# Patient Record
Sex: MALE | Race: WHITE | Employment: FULL TIME | URBAN - METROPOLITAN AREA
[De-identification: names, ages, dates, MRNs, and addresses within clinical notes are randomized per-mention and may not be internally consistent; named-entity substitution may affect disease eponyms.]

---

## 2021-03-23 ENCOUNTER — APPOINTMENT (OUTPATIENT)
Dept: CT IMAGING | Facility: CLINIC | Age: 71
End: 2021-03-23
Attending: EMERGENCY MEDICINE
Payer: COMMERCIAL

## 2021-03-23 ENCOUNTER — HOSPITAL ENCOUNTER (EMERGENCY)
Facility: CLINIC | Age: 71
Discharge: HOME | End: 2021-03-24
Attending: EMERGENCY MEDICINE | Admitting: EMERGENCY MEDICINE
Payer: COMMERCIAL

## 2021-03-23 DIAGNOSIS — R10.9 FLANK PAIN: ICD-10-CM

## 2021-03-23 DIAGNOSIS — E87.1 HYPONATREMIA: ICD-10-CM

## 2021-03-23 DIAGNOSIS — N20.1 URETEROLITHIASIS: ICD-10-CM

## 2021-03-23 DIAGNOSIS — K59.03 DRUG-INDUCED CONSTIPATION: ICD-10-CM

## 2021-03-23 DIAGNOSIS — R79.89 ELEVATED SERUM CREATININE: ICD-10-CM

## 2021-03-23 DIAGNOSIS — I10 HYPERTENSION, UNSPECIFIED TYPE: ICD-10-CM

## 2021-03-23 LAB
ALBUMIN UR-MCNC: 70 MG/DL
ANION GAP SERPL CALCULATED.3IONS-SCNC: 6 MMOL/L (ref 3–14)
APPEARANCE UR: CLEAR
BASOPHILS # BLD AUTO: 0 10E9/L (ref 0–0.2)
BASOPHILS NFR BLD AUTO: 0.2 %
BILIRUB UR QL STRIP: NEGATIVE
BUN SERPL-MCNC: 15 MG/DL (ref 7–30)
CALCIUM SERPL-MCNC: 9.5 MG/DL (ref 8.5–10.1)
CHLORIDE SERPL-SCNC: 97 MMOL/L (ref 94–109)
CO2 SERPL-SCNC: 25 MMOL/L (ref 20–32)
COLOR UR AUTO: ABNORMAL
CREAT SERPL-MCNC: 1.43 MG/DL (ref 0.66–1.25)
DIFFERENTIAL METHOD BLD: ABNORMAL
EOSINOPHIL # BLD AUTO: 0 10E9/L (ref 0–0.7)
EOSINOPHIL NFR BLD AUTO: 0.3 %
ERYTHROCYTE [DISTWIDTH] IN BLOOD BY AUTOMATED COUNT: 13.1 % (ref 10–15)
GFR SERPL CREATININE-BSD FRML MDRD: 49 ML/MIN/{1.73_M2}
GLUCOSE SERPL-MCNC: 134 MG/DL (ref 70–99)
GLUCOSE UR STRIP-MCNC: 100 MG/DL
HCT VFR BLD AUTO: 45.3 % (ref 40–53)
HGB BLD-MCNC: 15.2 G/DL (ref 13.3–17.7)
HGB UR QL STRIP: NEGATIVE
IMM GRANULOCYTES # BLD: 0.1 10E9/L (ref 0–0.4)
IMM GRANULOCYTES NFR BLD: 0.6 %
KETONES UR STRIP-MCNC: ABNORMAL MG/DL
LABORATORY COMMENT REPORT: NORMAL
LEUKOCYTE ESTERASE UR QL STRIP: NEGATIVE
LYMPHOCYTES # BLD AUTO: 0.5 10E9/L (ref 0.8–5.3)
LYMPHOCYTES NFR BLD AUTO: 5.9 %
MCH RBC QN AUTO: 29.9 PG (ref 26.5–33)
MCHC RBC AUTO-ENTMCNC: 33.6 G/DL (ref 31.5–36.5)
MCV RBC AUTO: 89 FL (ref 78–100)
MONOCYTES # BLD AUTO: 0.6 10E9/L (ref 0–1.3)
MONOCYTES NFR BLD AUTO: 6.7 %
NEUTROPHILS # BLD AUTO: 7.6 10E9/L (ref 1.6–8.3)
NEUTROPHILS NFR BLD AUTO: 86.3 %
NITRATE UR QL: NEGATIVE
NRBC # BLD AUTO: 0 10*3/UL
NRBC BLD AUTO-RTO: 0 /100
PH UR STRIP: 6 PH (ref 5–7)
PLATELET # BLD AUTO: 157 10E9/L (ref 150–450)
POTASSIUM SERPL-SCNC: 4.2 MMOL/L (ref 3.4–5.3)
RBC # BLD AUTO: 5.09 10E12/L (ref 4.4–5.9)
RBC #/AREA URNS AUTO: 1 /HPF (ref 0–2)
SARS-COV-2 RNA RESP QL NAA+PROBE: NEGATIVE
SODIUM SERPL-SCNC: 128 MMOL/L (ref 133–144)
SOURCE: ABNORMAL
SP GR UR STRIP: 1.02 (ref 1–1.03)
SPECIMEN SOURCE: NORMAL
UROBILINOGEN UR STRIP-MCNC: NORMAL MG/DL (ref 0–2)
WBC # BLD AUTO: 8.8 10E9/L (ref 4–11)
WBC #/AREA URNS AUTO: 1 /HPF (ref 0–5)

## 2021-03-23 PROCEDURE — 36415 COLL VENOUS BLD VENIPUNCTURE: CPT

## 2021-03-23 PROCEDURE — C9803 HOPD COVID-19 SPEC COLLECT: HCPCS

## 2021-03-23 PROCEDURE — 96376 TX/PRO/DX INJ SAME DRUG ADON: CPT

## 2021-03-23 PROCEDURE — 250N000011 HC RX IP 250 OP 636: Performed by: EMERGENCY MEDICINE

## 2021-03-23 PROCEDURE — 258N000003 HC RX IP 258 OP 636: Performed by: EMERGENCY MEDICINE

## 2021-03-23 PROCEDURE — 80048 BASIC METABOLIC PNL TOTAL CA: CPT | Performed by: EMERGENCY MEDICINE

## 2021-03-23 PROCEDURE — 96361 HYDRATE IV INFUSION ADD-ON: CPT

## 2021-03-23 PROCEDURE — 96374 THER/PROPH/DIAG INJ IV PUSH: CPT

## 2021-03-23 PROCEDURE — 99285 EMERGENCY DEPT VISIT HI MDM: CPT | Mod: 25

## 2021-03-23 PROCEDURE — 96375 TX/PRO/DX INJ NEW DRUG ADDON: CPT

## 2021-03-23 PROCEDURE — 87635 SARS-COV-2 COVID-19 AMP PRB: CPT | Performed by: EMERGENCY MEDICINE

## 2021-03-23 PROCEDURE — 74176 CT ABD & PELVIS W/O CONTRAST: CPT

## 2021-03-23 PROCEDURE — 85025 COMPLETE CBC W/AUTO DIFF WBC: CPT | Performed by: EMERGENCY MEDICINE

## 2021-03-23 PROCEDURE — 81001 URINALYSIS AUTO W/SCOPE: CPT | Performed by: EMERGENCY MEDICINE

## 2021-03-23 RX ORDER — SODIUM CHLORIDE 9 MG/ML
INJECTION, SOLUTION INTRAVENOUS CONTINUOUS
Status: DISCONTINUED | OUTPATIENT
Start: 2021-03-23 | End: 2021-03-23

## 2021-03-23 RX ORDER — HYDROMORPHONE HYDROCHLORIDE 1 MG/ML
0.5 INJECTION, SOLUTION INTRAMUSCULAR; INTRAVENOUS; SUBCUTANEOUS
Status: DISCONTINUED | OUTPATIENT
Start: 2021-03-23 | End: 2021-03-24 | Stop reason: HOSPADM

## 2021-03-23 RX ORDER — DOCUSATE SODIUM 100 MG/1
100 CAPSULE, LIQUID FILLED ORAL 3 TIMES DAILY PRN
Qty: 20 CAPSULE | Refills: 0 | Status: SHIPPED | OUTPATIENT
Start: 2021-03-23

## 2021-03-23 RX ORDER — KETOROLAC TROMETHAMINE 15 MG/ML
15 INJECTION, SOLUTION INTRAMUSCULAR; INTRAVENOUS ONCE
Status: COMPLETED | OUTPATIENT
Start: 2021-03-23 | End: 2021-03-23

## 2021-03-23 RX ORDER — TAMSULOSIN HYDROCHLORIDE 0.4 MG/1
0.4 CAPSULE ORAL ONCE
Status: DISCONTINUED | OUTPATIENT
Start: 2021-03-23 | End: 2021-03-24 | Stop reason: HOSPADM

## 2021-03-23 RX ORDER — SODIUM CHLORIDE 9 MG/ML
INJECTION, SOLUTION INTRAVENOUS ONCE
Status: DISCONTINUED | OUTPATIENT
Start: 2021-03-23 | End: 2021-03-24 | Stop reason: HOSPADM

## 2021-03-23 RX ORDER — ONDANSETRON 2 MG/ML
4 INJECTION INTRAMUSCULAR; INTRAVENOUS EVERY 30 MIN PRN
Status: DISCONTINUED | OUTPATIENT
Start: 2021-03-23 | End: 2021-03-24 | Stop reason: HOSPADM

## 2021-03-23 RX ADMIN — SODIUM CHLORIDE 1000 ML: 9 INJECTION, SOLUTION INTRAVENOUS at 21:59

## 2021-03-23 RX ADMIN — HYDROMORPHONE HYDROCHLORIDE 0.5 MG: 1 INJECTION, SOLUTION INTRAMUSCULAR; INTRAVENOUS; SUBCUTANEOUS at 22:28

## 2021-03-23 RX ADMIN — KETOROLAC TROMETHAMINE 15 MG: 15 INJECTION, SOLUTION INTRAMUSCULAR; INTRAVENOUS at 22:28

## 2021-03-23 RX ADMIN — HYDROMORPHONE HYDROCHLORIDE 0.5 MG: 1 INJECTION, SOLUTION INTRAMUSCULAR; INTRAVENOUS; SUBCUTANEOUS at 22:00

## 2021-03-23 RX ADMIN — ONDANSETRON 4 MG: 2 INJECTION INTRAMUSCULAR; INTRAVENOUS at 22:27

## 2021-03-23 RX ADMIN — HYDROMORPHONE HYDROCHLORIDE 0.5 MG: 1 INJECTION, SOLUTION INTRAMUSCULAR; INTRAVENOUS; SUBCUTANEOUS at 23:20

## 2021-03-23 ASSESSMENT — ENCOUNTER SYMPTOMS
ABDOMINAL PAIN: 1
HEMATURIA: 0
CONSTIPATION: 1
ABDOMINAL DISTENTION: 1
DYSURIA: 0

## 2021-03-24 VITALS
DIASTOLIC BLOOD PRESSURE: 70 MMHG | HEART RATE: 69 BPM | OXYGEN SATURATION: 96 % | TEMPERATURE: 97.8 F | SYSTOLIC BLOOD PRESSURE: 112 MMHG | RESPIRATION RATE: 20 BRPM | WEIGHT: 185 LBS

## 2021-03-24 RX ORDER — OXYCODONE HYDROCHLORIDE 5 MG/1
5 TABLET ORAL EVERY 6 HOURS PRN
Qty: 8 TABLET | Refills: 0 | Status: SHIPPED | OUTPATIENT
Start: 2021-03-24

## 2021-03-24 NOTE — ED PROVIDER NOTES
History     Chief Complaint:  Flank Pain       The history is provided by the patient.     Marcio Renner is a 70 year old male with a history of hypercholesterolemia, hyperlipidemia, CAD, and ASCVD among others who presents for evaluation of left flank pain. The patient was seen at urgent care two days ago and was found to have a ureteral stone. He was discharged to home with oxycodone and Zofran for symptom management and instructed on close outpatient follow up. However, he has continued to have severe left sided flank pain and yesterday also developed abdominal distension. Today he has had left groin pain as well. Given his continued severe harmony,n which he ranks as a 10/10, he decided to present. He has not taken any interventions for pain within the past four hours.     Here, he denies hematuria, dysuria, or other symptoms. He has been taking Miralax though has not had a bowel movement in two days.      CT Abdomen Pelvis w/o IV Contrast - stone protocol from 03/21/2021:  IMPRESSION:      1. Mild left hydronephrosis and hydroureter likely caused by a 1 to 2 mm calculus in the distal left ureter. There may be an additional punctate calculus in the left mid ureter. Moderate fluid stranding surrounding the left kidney and proximal to mid left ureter.    2. No additional urinary tract calculi.    3. Mildly prominent central mesenteric lymph nodes with surrounding lucent halos, suggesting mesenteric panniculitis.    Reading per radiology.     Review of Systems   Gastrointestinal: Positive for abdominal distention, abdominal pain and constipation.   Genitourinary: Negative for dysuria and hematuria.   All other systems reviewed and are negative.        Allergies:  Atorvastatin      Medications:   Atorvastatin   Nitroglycerin   Ezetimibe   Albuterol sulfate  Advair inhaler  Losartan   Rosuvastatin   Valtrex   Tadalafil  Oxycodone   Zofran   Lamisil     Medical History:   CAD  Hyperlipidemia   Hypercholesterolemia    Spondylolysis  Lumbar stenosis with neurogenic claudication   HSV  GERD  Asthma   Atherosclerotic cardiovascular disease  Psoriasis   Headaches  Colon polyp    Surgical History:  CABG  TKA - right  GSVRFA and avulsions - bilateral   Knee arthroscopy - left  Shoulder  surgery   Back surgery - lumbar fusion   Tonsillectomy & Adenoidectomy   Sinus surgery - left  Rotator cuff repair - right    Family History:   Father -  Heart disease     Social History:  The patient was accompanied to the ED by his wife.  Marital Status:       Physical Exam     Patient Vitals for the past 24 hrs:   BP Temp Temp src Pulse Resp SpO2 Weight   03/23/21 2200 (!) 167/98 -- -- 84 -- 97 % --   03/23/21 2110 (!) 174/97 -- -- 95 20 98 % 83.9 kg (185 lb)   03/23/21 2109 -- 97.8  F (36.6  C) Temporal -- -- -- --          Physical Exam    General: The patient is alert, in no respiratory distress. Uncomfortable.     HENT: Mucous membranes moist.    Cardiovascular: Regular rate and rhythm. Good pulses in all four extremities. Normal capillary refill and skin turgor.     Respiratory: Lungs are clear. No nasal flaring. No retractions. No wheezing, no crackles.    Gastrointestinal: No palpable hernias. Abdomen is distended with some firmness and mild tenderness. No rebound. Not tympanitic.       Musculoskeletal: No gross deformity.     Skin: No rashes or petechiae. Slight pallor.    Neurologic: The patient is alert and oriented x3. GCS 15. No testable cranial nerve deficit. Follows commands with clear and appropriate speech. Gives appropriate answers. Good strength in all extremities. No gross neurologic deficit. Gross sensation intact. Pupils are round and reactive. No meningismus.     Lymphatic: No cervical adenopathy. No lower extremity swelling.    Psychiatric: The patient is non-tearful.      Emergency Department Course     Imaging:    CT Abdomen Pelvis w/o Contrast:   IMPRESSION:   1. 0.2 cm distal left ureteral calculus, resulting in  mild to moderate obstruction.   2. No additional renal or ureteral calculi.   Reading per radiology.     Laboratory:    CBC: WBC 8.8, HGB 15.2,   BMP:  (L), Glucose 134 (H), Creatinine 1.43 (H), GFR 49 (L),  o/w WNL     UA with Microscopic: Glucose 100 (A), Ketones Trace (A), Protein Albumin 70 (A), o/w WNL    Asymptomatic COVID-19 (Coronavirus) PCR by Nasopharyngeal Swab: Negative    Emergency Department Course:    Reviewed:  2214 I reviewed the patient's nursing notes, vitals, past medical records, Care Everywhere.     Assessments:  2216 I performed an exam of the patient, as documented above.   2310 I rechecked the patient. His pain is improved and his abdominal distention is also improving.   2338 Patient rechecked and updated following imaging and laboratory results. His pain is well controlled and he feels comfortable with discharge.     Interventions:  2159 Normal Saline 1000 mL IV   2200 Dilaudid 0.5 mg IV   2227 Zofran 4 mg IV   2228 Dilaudid 0.5 mg IV   2228 Toradol 15 mg IV   2320 Dilaudid 0.5 mg IV     Disposition:  The patient was discharged to home.     Impression & Plan     Medical Decision Making:  The patient presented complaining of severe left flank pain and was very bloated.  I reviewed his outside records and he does have a proven kidney stone that is quite small on the left side.  However with his abdomen being so large and significant symptoms I considered alternative causes such as bowel obstruction aortic issues diverticulitis amongst others.  A CT scan was ordered to look for these causes.  The stone is still present is quite small otherwise the CT looks good.  Discussed his results.  It may be that his pain is worse because he is not taking pain medication frequently.  I am suspicious about constipation and review of his CT scan myself.  After treatment with pain medication here the patient was much more comfortable he does not show signs of a UTI he is otherwise stable.  I  did write him for some more pain medication and he was discharged to close follow-up.  Symptoms to return for discussed.  He did have slight elevation of his creatinine which is likely secondary to the stone.  I felt that pain is contributing to his elevated blood pressure and there is slight hyponatremia which is also kidney stone related.  With constipation I started on Dulcolax and he will follow-up with urology and his primary care doctor.    Covid-19  Marcio Renner was evaluated during a global COVID-19 pandemic, which necessitated consideration that the patient might be at risk for infection with the SARS-CoV-2 virus that causes COVID-19.   Applicable protocols for evaluation were followed during the patient's care.   COVID-19 was considered as part of the patient's evaluation. The plan for testing is:  a test was obtained during this visit.     Diagnosis:     ICD-10-CM    1. Flank pain  R10.9    2. Ureterolithiasis  N20.1    3. Hypertension, unspecified type  I10    4. Elevated serum creatinine  R79.89    5. Hyponatremia  E87.1    6. Drug-induced constipation  K59.03         Discharge Medications:  Discharge Medication List as of 3/24/2021 12:31 AM      START taking these medications    Details   docusate sodium (COLACE) 100 MG capsule Take 1 capsule (100 mg) by mouth 3 times daily as needed for constipation, Disp-20 capsule, R-0, Local Print             Scribe Disclosure:  Sara BARONE, am serving as a scribe at 10:13 PM on 3/23/2021 to document services personally performed by Yeison Medina MD based on my observations and the provider's statements to me.      Yeison Median MD  03/24/21 [id number]

## 2021-03-24 NOTE — DISCHARGE INSTRUCTIONS
Discharge Instructions  Kidney Stones    Kidney stones are a common problem that can cause a lot of pain but fortunately are usually not dangerous and can be generally treated with medicine at home.  However, sometimes your condition may be worse than it seemed at first, or may get worse with time.     You need to follow-up with your regular doctor within 3 days.    Most kidney stones will pass on their own, but occasionally stones may need to be removed by an urologist. We will send you home with a urine strainer. Be sure to urinate into this, or urinate into a container and pour the urine through the fine filter to catch the kidney stone as it comes out. The stone will seem like a pebble or grain of sand. Be sure to save this in a Ziploc  bag and take it to the doctor s office with you.       Return to the Emergency Department if:  Your pain is not controlled.  You are vomiting and can t keep fluids or medications down.  You develop fever (>101).  You feel much more ill or develop new symptoms.  What can I do to help myself?  Be sure to drink plenty of fluids.  Staying active is good, and may help the stone to pass. You may do whatever you feel up to doing without restrictions.   Treatment:  Non-steroidal anti-inflammatory drugs (NSAIDs). This includes prescription medicines like Toradol  (ketorolac) and non-prescription medicines like Advil  (ibuprofen) and Nuprin  (ibuprofen). These pain relievers are very effective for kidney stones.  Narcotic pain pills. If you have been given a narcotic such as Vicodin  (hydrocodone with acetaminophen), Percocet  (oxycodone with acetaminophen), or codeine, do not drive for four hours after you have taken it. If the narcotic contains Tylenol  (acetaminophen), do not take Tylenol  with it. All narcotics will cause constipation, so eat a high fiber diet.    Nausea medication.  Nausea and vomiting are common with kidney stones, so your physician may send you home with medicine  for this.   Flomax  (tamsulosin). This medicine is sometimes used for men with prostate problems, but also can help kidney stones to pass. This medicine can lower blood pressure, and you may feel faint, especially when you first stand up. Be sure to get up gradually, sit down if you feel faint, and avoid activity where feeling faint would be dangerous, such as climbing ladders.   If you were given a prescription for medicine here today, be sure to read all of the information (including the package insert) that comes with your prescription.  This will include important information about the medicine, its side effects, and any warnings that you need to know about.  The pharmacist who fills the prescription can provide more information and answer questions you may have about the medicine.  If you have questions or concerns that the pharmacist cannot address, please call or return to the Emergency Department.   Opioid Medication Information    Pain medications are among the most commonly prescribed medicines, so we are including this information for all our patients. If you did not receive pain medication or get a prescription for pain medicine, you can ignore it.     You may have been given a prescription for an opioid (narcotic) pain medicine and/or have received a pain medicine while here in the Emergency Department. These medicines can make you drowsy or impaired. You must not drive, operate dangerous equipment, or engage in any other dangerous activities while taking these medications. If you drive while taking these medications, you could be arrested for DUI, or driving under the influence. Do not drink any alcohol while you are taking these medications.     Opioid pain medications can cause addiction. If you have a history of chemical dependency of any type, you are at a higher risk of becoming addicted to pain medications.  Only take these prescribed medications to treat your pain when all other options have  been tried. Take it for as short a time and as few doses as possible. Store your pain pills in a secure place, as they are frequently stolen and provide a dangerous opportunity for children or visitors in your house to start abusing these powerful medications. We will not replace any lost or stolen medicine.  As soon as your pain is better, you should flush all your remaining medication.     Many prescription pain medications contain Tylenol  (acetaminophen), including Vicodin , Tylenol #3 , Norco , Lortab , and Percocet .  You should not take any extra pills of Tylenol  if you are using these prescription medications or you can get very sick.  Do not ever take more than 3000 mg of acetaminophen in any 24 hour period.    All opioids tend to cause constipation. Drink plenty of water and eat foods that have a lot of fiber, such as fruits, vegetables, prune juice, apple juice and high fiber cereal.  Take a laxative if you don t move your bowels at least every other day. Miralax , Milk of Magnesia, Colace , or Senna  can be used to keep you regular.      Remember that you can always come back to the Emergency Department if you are not able to see your regular doctor in the amount of time listed above, if you get any new symptoms, or if there is anything that worries you.    Discharge Instructions  Hypertension - High Blood Pressure    During you visit to the Emergency Department, your blood pressure was higher than the recommended blood pressure.  This may be related to stress, pain, medication or other temporary conditions. In these cases, your blood pressure may return to normal on its own. If you have a history of high blood pressure, you may need to have your doctor adjust your medications. Sometimes, your high measurement here may indicate that you have developed high blood pressure that will stay high unless it is treated. Sudden very high blood pressure can cause problems, but usually high blood pressure causes  problems over months to years.      Blood pressure is almost never lowered in the Emergency Department, because studies have shown that lowering blood pressure too quickly is much more dangerous than leaving it alone.    You need to follow up with your doctor in 1-3 days to get your blood pressure rechecked.     Return to the Emergency Department if you start to have:  A severe headache.  Chest pain.  Shortness of breath.  Weakness or numbness that affects one part of the body.  Confusion.  Vision changes.  Significant swelling of legs and/or eyes.  A reaction to any medication started in the Emergency Department.    What can I do to help myself?  Avoid alcohol.  Take any blood pressure medicine that you are prescribed.  Get a good night s sleep.  Lower your salt intake.  Exercise.  Lose weight.  Manage stress.    If blood pressure medication was started in the Emergency Department:  The medicine may not have an immediate effect. The body and brain determine what blood pressure you have. The medicine s job is to retrain the body s  thermostat  to a lower blood pressure.  You will need to follow up with your doctor to see how this medicine is working for you.  If you were given a prescription for medicine here today, be sure to read all of the information (including the package insert) that comes with your prescription.  This will include important information about the medicine, its side effects, and any warnings that you need to know about.  The pharmacist who fills the prescription can provide more information and answer questions you may have about the medicine.  If you have questions or concerns that the pharmacist cannot address, please call or return to the Emergency Department.   Opioid Medication Information    Pain medications are among the most commonly prescribed medicines, so we are including this information for all our patients. If you did not receive pain medication or get a prescription for pain  medicine, you can ignore it.     You may have been given a prescription for an opioid (narcotic) pain medicine and/or have received a pain medicine while here in the Emergency Department. These medicines can make you drowsy or impaired. You must not drive, operate dangerous equipment, or engage in any other dangerous activities while taking these medications. If you drive while taking these medications, you could be arrested for DUI, or driving under the influence. Do not drink any alcohol while you are taking these medications.     Opioid pain medications can cause addiction. If you have a history of chemical dependency of any type, you are at a higher risk of becoming addicted to pain medications.  Only take these prescribed medications to treat your pain when all other options have been tried. Take it for as short a time and as few doses as possible. Store your pain pills in a secure place, as they are frequently stolen and provide a dangerous opportunity for children or visitors in your house to start abusing these powerful medications. We will not replace any lost or stolen medicine.  As soon as your pain is better, you should flush all your remaining medication.     Many prescription pain medications contain Tylenol  (acetaminophen), including Vicodin , Tylenol #3 , Norco , Lortab , and Percocet .  You should not take any extra pills of Tylenol  if you are using these prescription medications or you can get very sick.  Do not ever take more than 3000 mg of acetaminophen in any 24 hour period.    All opioids tend to cause constipation. Drink plenty of water and eat foods that have a lot of fiber, such as fruits, vegetables, prune juice, apple juice and high fiber cereal.  Take a laxative if you don t move your bowels at least every other day. Miralax , Milk of Magnesia, Colace , or Senna  can be used to keep you regular.      Remember that you can always come back to the Emergency Department if you are not able  to see your regular doctor in the amount of time listed above, if you get any new symptoms, or if there is anything that worries you.

## 2021-03-24 NOTE — ED TRIAGE NOTES
Saturday night, left flank pain started and woke pt up from his sleep. Went to  and found he has a kidney stone. Was sent home with rx of oxycodone and zofran. Pain has not gotten better and now radiating to the left lower flank. Also no BM since Sunday. ABCs intact. A&Ox3.

## 2024-12-19 ENCOUNTER — HOSPITAL ENCOUNTER (OUTPATIENT)
Facility: CLINIC | Age: 74
Setting detail: OBSERVATION
End: 2024-12-19
Attending: STUDENT IN AN ORGANIZED HEALTH CARE EDUCATION/TRAINING PROGRAM | Admitting: INTERNAL MEDICINE
Payer: COMMERCIAL

## 2024-12-19 ENCOUNTER — APPOINTMENT (OUTPATIENT)
Dept: CT IMAGING | Facility: CLINIC | Age: 74
End: 2024-12-19
Attending: STUDENT IN AN ORGANIZED HEALTH CARE EDUCATION/TRAINING PROGRAM
Payer: COMMERCIAL

## 2024-12-19 ENCOUNTER — APPOINTMENT (OUTPATIENT)
Dept: ULTRASOUND IMAGING | Facility: CLINIC | Age: 74
End: 2024-12-19
Attending: STUDENT IN AN ORGANIZED HEALTH CARE EDUCATION/TRAINING PROGRAM
Payer: COMMERCIAL

## 2024-12-19 DIAGNOSIS — J81.0 ACUTE PULMONARY EDEMA (H): ICD-10-CM

## 2024-12-19 DIAGNOSIS — Z87.39 HISTORY OF GIANT CELL ARTERITIS: ICD-10-CM

## 2024-12-19 DIAGNOSIS — I48.91 ATRIAL FIBRILLATION WITH RVR (H): Primary | ICD-10-CM

## 2024-12-19 DIAGNOSIS — R52 PAIN: ICD-10-CM

## 2024-12-19 DIAGNOSIS — I50.22 CHRONIC SYSTOLIC HEART FAILURE (H): ICD-10-CM

## 2024-12-19 LAB
ANION GAP SERPL CALCULATED.3IONS-SCNC: 14 MMOL/L (ref 7–15)
ATRIAL RATE - MUSE: NORMAL BPM
BASOPHILS # BLD AUTO: 0 10E3/UL (ref 0–0.2)
BASOPHILS NFR BLD AUTO: 0 %
BUN SERPL-MCNC: 24.4 MG/DL (ref 8–23)
CALCIUM SERPL-MCNC: 9.3 MG/DL (ref 8.8–10.4)
CHLORIDE SERPL-SCNC: 99 MMOL/L (ref 98–107)
CREAT SERPL-MCNC: 0.95 MG/DL (ref 0.67–1.17)
D DIMER PPP FEU-MCNC: 1.19 UG/ML FEU (ref 0–0.5)
DIASTOLIC BLOOD PRESSURE - MUSE: NORMAL MMHG
EGFRCR SERPLBLD CKD-EPI 2021: 84 ML/MIN/1.73M2
EOSINOPHIL # BLD AUTO: 0 10E3/UL (ref 0–0.7)
EOSINOPHIL NFR BLD AUTO: 0 %
ERYTHROCYTE [DISTWIDTH] IN BLOOD BY AUTOMATED COUNT: 15.9 % (ref 10–15)
FLUAV RNA SPEC QL NAA+PROBE: NEGATIVE
FLUBV RNA RESP QL NAA+PROBE: NEGATIVE
GLUCOSE SERPL-MCNC: 152 MG/DL (ref 70–99)
HCO3 SERPL-SCNC: 21 MMOL/L (ref 22–29)
HCT VFR BLD AUTO: 41.9 % (ref 40–53)
HGB BLD-MCNC: 13.2 G/DL (ref 13.3–17.7)
HOLD SPECIMEN: NORMAL
HOLD SPECIMEN: NORMAL
IMM GRANULOCYTES # BLD: 0.2 10E3/UL
IMM GRANULOCYTES NFR BLD: 2 %
INTERPRETATION ECG - MUSE: NORMAL
LYMPHOCYTES # BLD AUTO: 0.5 10E3/UL (ref 0.8–5.3)
LYMPHOCYTES NFR BLD AUTO: 4 %
MAGNESIUM SERPL-MCNC: 2.2 MG/DL (ref 1.7–2.3)
MCH RBC QN AUTO: 27.8 PG (ref 26.5–33)
MCHC RBC AUTO-ENTMCNC: 31.5 G/DL (ref 31.5–36.5)
MCV RBC AUTO: 88 FL (ref 78–100)
MONOCYTES # BLD AUTO: 0.3 10E3/UL (ref 0–1.3)
MONOCYTES NFR BLD AUTO: 2 %
NEUTROPHILS # BLD AUTO: 12.8 10E3/UL (ref 1.6–8.3)
NEUTROPHILS NFR BLD AUTO: 92 %
NRBC # BLD AUTO: 0 10E3/UL
NRBC BLD AUTO-RTO: 0 /100
NT-PROBNP SERPL-MCNC: 734 PG/ML (ref 0–900)
P AXIS - MUSE: NORMAL DEGREES
PLATELET # BLD AUTO: 214 10E3/UL (ref 150–450)
POTASSIUM SERPL-SCNC: 4.7 MMOL/L (ref 3.4–5.3)
PR INTERVAL - MUSE: NORMAL MS
QRS DURATION - MUSE: 78 MS
QT - MUSE: 298 MS
QTC - MUSE: 414 MS
R AXIS - MUSE: 81 DEGREES
RBC # BLD AUTO: 4.74 10E6/UL (ref 4.4–5.9)
RSV RNA SPEC NAA+PROBE: NEGATIVE
SARS-COV-2 RNA RESP QL NAA+PROBE: NEGATIVE
SODIUM SERPL-SCNC: 134 MMOL/L (ref 135–145)
SYSTOLIC BLOOD PRESSURE - MUSE: NORMAL MMHG
T AXIS - MUSE: -64 DEGREES
TROPONIN T SERPL HS-MCNC: 15 NG/L
TROPONIN T SERPL HS-MCNC: 20 NG/L
TSH SERPL DL<=0.005 MIU/L-ACNC: 1.2 UIU/ML (ref 0.3–4.2)
VENTRICULAR RATE- MUSE: 116 BPM
WBC # BLD AUTO: 13.8 10E3/UL (ref 4–11)

## 2024-12-19 PROCEDURE — 250N000011 HC RX IP 250 OP 636: Performed by: INTERNAL MEDICINE

## 2024-12-19 PROCEDURE — 36415 COLL VENOUS BLD VENIPUNCTURE: CPT | Performed by: STUDENT IN AN ORGANIZED HEALTH CARE EDUCATION/TRAINING PROGRAM

## 2024-12-19 PROCEDURE — 71275 CT ANGIOGRAPHY CHEST: CPT

## 2024-12-19 PROCEDURE — 80048 BASIC METABOLIC PNL TOTAL CA: CPT | Performed by: STUDENT IN AN ORGANIZED HEALTH CARE EDUCATION/TRAINING PROGRAM

## 2024-12-19 PROCEDURE — 83880 ASSAY OF NATRIURETIC PEPTIDE: CPT | Performed by: EMERGENCY MEDICINE

## 2024-12-19 PROCEDURE — 93005 ELECTROCARDIOGRAM TRACING: CPT

## 2024-12-19 PROCEDURE — 250N000013 HC RX MED GY IP 250 OP 250 PS 637: Performed by: INTERNAL MEDICINE

## 2024-12-19 PROCEDURE — 99285 EMERGENCY DEPT VISIT HI MDM: CPT | Mod: 25

## 2024-12-19 PROCEDURE — 87637 SARSCOV2&INF A&B&RSV AMP PRB: CPT | Performed by: STUDENT IN AN ORGANIZED HEALTH CARE EDUCATION/TRAINING PROGRAM

## 2024-12-19 PROCEDURE — 250N000011 HC RX IP 250 OP 636: Performed by: STUDENT IN AN ORGANIZED HEALTH CARE EDUCATION/TRAINING PROGRAM

## 2024-12-19 PROCEDURE — 84443 ASSAY THYROID STIM HORMONE: CPT | Performed by: STUDENT IN AN ORGANIZED HEALTH CARE EDUCATION/TRAINING PROGRAM

## 2024-12-19 PROCEDURE — 82310 ASSAY OF CALCIUM: CPT | Performed by: STUDENT IN AN ORGANIZED HEALTH CARE EDUCATION/TRAINING PROGRAM

## 2024-12-19 PROCEDURE — 84484 ASSAY OF TROPONIN QUANT: CPT | Performed by: STUDENT IN AN ORGANIZED HEALTH CARE EDUCATION/TRAINING PROGRAM

## 2024-12-19 PROCEDURE — 85379 FIBRIN DEGRADATION QUANT: CPT | Performed by: STUDENT IN AN ORGANIZED HEALTH CARE EDUCATION/TRAINING PROGRAM

## 2024-12-19 PROCEDURE — G0378 HOSPITAL OBSERVATION PER HR: HCPCS

## 2024-12-19 PROCEDURE — 99222 1ST HOSP IP/OBS MODERATE 55: CPT | Performed by: INTERNAL MEDICINE

## 2024-12-19 PROCEDURE — 83735 ASSAY OF MAGNESIUM: CPT | Performed by: EMERGENCY MEDICINE

## 2024-12-19 PROCEDURE — 250N000009 HC RX 250: Performed by: STUDENT IN AN ORGANIZED HEALTH CARE EDUCATION/TRAINING PROGRAM

## 2024-12-19 PROCEDURE — 96375 TX/PRO/DX INJ NEW DRUG ADDON: CPT

## 2024-12-19 PROCEDURE — 85025 COMPLETE CBC W/AUTO DIFF WBC: CPT | Performed by: STUDENT IN AN ORGANIZED HEALTH CARE EDUCATION/TRAINING PROGRAM

## 2024-12-19 PROCEDURE — 96374 THER/PROPH/DIAG INJ IV PUSH: CPT

## 2024-12-19 PROCEDURE — 93970 EXTREMITY STUDY: CPT

## 2024-12-19 RX ORDER — ONDANSETRON 4 MG/1
4 TABLET, ORALLY DISINTEGRATING ORAL EVERY 6 HOURS PRN
Status: ACTIVE | OUTPATIENT
Start: 2024-12-19

## 2024-12-19 RX ORDER — DILTIAZEM HYDROCHLORIDE 5 MG/ML
20 INJECTION INTRAVENOUS ONCE
Status: COMPLETED | OUTPATIENT
Start: 2024-12-19 | End: 2024-12-19

## 2024-12-19 RX ORDER — ACETAMINOPHEN 650 MG/1
650 SUPPOSITORY RECTAL EVERY 4 HOURS PRN
Status: ACTIVE | OUTPATIENT
Start: 2024-12-19

## 2024-12-19 RX ORDER — ACETAMINOPHEN 325 MG/1
650 TABLET ORAL EVERY 4 HOURS PRN
Status: ACTIVE | OUTPATIENT
Start: 2024-12-19

## 2024-12-19 RX ORDER — AMOXICILLIN 250 MG
1 CAPSULE ORAL 2 TIMES DAILY PRN
Status: ACTIVE | OUTPATIENT
Start: 2024-12-19

## 2024-12-19 RX ORDER — PROCHLORPERAZINE MALEATE 5 MG/1
5 TABLET ORAL EVERY 6 HOURS PRN
Status: ACTIVE | OUTPATIENT
Start: 2024-12-19

## 2024-12-19 RX ORDER — ONDANSETRON 2 MG/ML
4 INJECTION INTRAMUSCULAR; INTRAVENOUS EVERY 6 HOURS PRN
Status: ACTIVE | OUTPATIENT
Start: 2024-12-19

## 2024-12-19 RX ORDER — IOPAMIDOL 755 MG/ML
120 INJECTION, SOLUTION INTRAVASCULAR ONCE
Status: COMPLETED | OUTPATIENT
Start: 2024-12-19 | End: 2024-12-19

## 2024-12-19 RX ORDER — FUROSEMIDE 10 MG/ML
20 INJECTION INTRAMUSCULAR; INTRAVENOUS EVERY 12 HOURS
Status: DISPENSED | OUTPATIENT
Start: 2024-12-19

## 2024-12-19 RX ORDER — AMOXICILLIN 250 MG
2 CAPSULE ORAL 2 TIMES DAILY PRN
Status: ACTIVE | OUTPATIENT
Start: 2024-12-19

## 2024-12-19 RX ADMIN — IOPAMIDOL 80 ML: 755 INJECTION, SOLUTION INTRAVENOUS at 15:43

## 2024-12-19 RX ADMIN — SODIUM CHLORIDE 80 ML: 9 INJECTION, SOLUTION INTRAVENOUS at 15:45

## 2024-12-19 RX ADMIN — APIXABAN 5 MG: 5 TABLET, FILM COATED ORAL at 20:17

## 2024-12-19 RX ADMIN — FUROSEMIDE 20 MG: 10 INJECTION, SOLUTION INTRAMUSCULAR; INTRAVENOUS at 20:17

## 2024-12-19 RX ADMIN — DILTIAZEM HYDROCHLORIDE 20 MG: 5 INJECTION, SOLUTION INTRAVENOUS at 16:03

## 2024-12-19 RX ADMIN — METOPROLOL TARTRATE 12.5 MG: 25 TABLET, FILM COATED ORAL at 20:17

## 2024-12-19 ASSESSMENT — ACTIVITIES OF DAILY LIVING (ADL)
ADLS_ACUITY_SCORE: 41

## 2024-12-19 ASSESSMENT — COLUMBIA-SUICIDE SEVERITY RATING SCALE - C-SSRS
2. HAVE YOU ACTUALLY HAD ANY THOUGHTS OF KILLING YOURSELF IN THE PAST MONTH?: NO
6. HAVE YOU EVER DONE ANYTHING, STARTED TO DO ANYTHING, OR PREPARED TO DO ANYTHING TO END YOUR LIFE?: NO
1. IN THE PAST MONTH, HAVE YOU WISHED YOU WERE DEAD OR WISHED YOU COULD GO TO SLEEP AND NOT WAKE UP?: NO

## 2024-12-19 NOTE — ED NOTES
"ED APC SUPERVISION NOTE:   I evaluated this patient in conjunction with Melissa Cabezas PA-C  I have participated in the care of the patient and personally performed key elements of the history, exam, and medical decision making.      HPI:   Marcio Renner is a 74 year old male with history of CAD, hypertension, prediabetes, and prostate cancer who presents to the ED for evaluation of shortness of breath. The patient reports he has been on prednisone for about a month for giant cell arteritis and has been tapering off of this. Today he noticed he had increased work of breathing, congestion, and left chest pressure. He presented to Urgent Care where he was diagnosed with a-fib. He has never had a-fib before. Denies blood thinner use. He also notes that he has had bilateral lower extremity edema for the past 2 weeks.     Independent Historian:   None     Review of External Notes: ***      EXAM:   General: Well-nourished, resting comfortably when I enter the room  Eyes: Pupils equal, conjunctivae pink no scleral icterus or conjunctival injection  ENT:  Moist mucus membranes  Respiratory:  Lungs clear to auscultation bilaterally, no crackles/rubs/wheezes.  Good air movement  CV: Normal rate and rhythm, no murmurs  GI:  Abdomen soft and non-distended.  No tenderness, guarding or rebound  Skin: Warm, dry.  No rashes or petechiae  Musculoskeletal: No peripheral edema or calf tenderness  Neuro: Alert and oriented to person/place/time  Psychiatric: Normal affect      Independent Interpretation (X-rays, CTs, rhythm strip):  {IndependentReview:427471::\"None\"}     Consultations/Discussion of Management or Tests:  {Consults/Care Discussions:144881::\"None\"}    DIAGNOSIS:     ICD-10-CM    1. Atrial fibrillation with RVR (H)  I48.91       2. Acute pulmonary edema (H)  J81.0       3. History of giant cell arteritis  Z87.39         Scribe Disclosure:  CAROLIN BARONE, am serving as a scribe at 2:50 PM on 12/19/2024 to document " services personally performed by Helen Mahmood MD based on my observations and the provider's statements to me.     12/19/2024  [facility] EMERGENCY DEPT

## 2024-12-19 NOTE — ED PROVIDER NOTES
Emergency Department Note      History of Present Illness     Chief Complaint   Shortness of Breath      HPI   Marcio Renner is a 74 year old male with history of coronary artery disease, prostate cancer, carotid artery stenosis, hypertension, prediabetes, who presents with new onset atrial fibrillation.  Patient states that he has been on prednisone for roughly 1 month treating giant cell arteritis.  For the past week or two, he has noticed that he has been more out of breath with exertion. Went to urgent care today due to increased work of breathing and sinus pressure, was noted to be in atrial fibrillation so was sent to ER for further evaluation. Patient does not have history of this. He is not anticoagulated outside of baby aspirin once daily. Patient states that for the past week or so, he has noted bilateral lower extremity edema that is worsening along with intermittent calf pain bilaterally. States that if he takes deep breath in, he has some left sided chest tightness. Denies any chest pain at rest.     Independent Historian   None    Review of External Notes   Reviewed [facility] note from today - reports increased work of breathing, cough with deep inspiration, and chest tightness. Noted to be in Afib with RVR. Received full dose aspirin prior to coming to ER.     Past Medical History     Medical History and Problem List   No past medical history on file.    Medications   docusate sodium (COLACE) 100 MG capsule  oxyCODONE (ROXICODONE) 5 MG tablet        Surgical History   Past Surgical History:   Procedure Laterality Date    IR LUMBAR PUNCTURE  4/12/2023    IR LUMBAR PUNCTURE  7/8/2019       Physical Exam     Patient Vitals for the past 24 hrs:   BP Temp Temp src Pulse Resp SpO2 Height Weight   12/19/24 1643 (!) 115/90 -- -- 100 20 96 % -- --   12/19/24 1630 116/89 -- -- 107 23 96 % -- --   12/19/24 1615 104/73 -- -- 77 19 95 % -- --   12/19/24 1609 90/75 -- -- 96 21 95 % -- --   12/19/24 1602  "122/47 -- -- (!) 134 19 97 % -- --   12/19/24 1532 (!) 114/102 -- -- 118 22 95 % -- --   12/19/24 1516 (!) 123/111 -- -- (!) 122 18 95 % -- --   12/19/24 1501 (!) 126/94 -- -- 107 20 96 % -- --   12/19/24 1446 (!) 126/98 -- -- (!) 128 -- 95 % -- --   12/19/24 1431 (!) 116/92 -- -- 116 19 96 % -- --   12/19/24 1416 (!) 120/98 -- -- -- -- 97 % -- --   12/19/24 1244 (!) 144/113 97.6  F (36.4  C) Temporal 113 20 98 % 1.676 m (5' 6\") 88.5 kg (195 lb)     Physical Exam  General: Alert and cooperative with exam. Patient in no apparent distress. Normal mentation.  Head:  Scalp is NC/AT  Eyes:  No scleral icterus, PERRL  ENT:  The external nose and ears are normal.   Neck:  Normal range of motion without rigidity.  CV:  Irregularly irregular rhythm, rapid rate    No pathologic murmur   Resp:  Breath sounds are clear bilaterally    Non-labored, no retractions or accessory muscle use  GI:  Abdomen is soft, no distension, no tenderness. No peritoneal signs  MS:  Bilateral lower extremity edema, pitting. More significant on left.   Skin:  Warm and dry, No rash or lesions noted.  Neuro:  Oriented x 3. No gross motor deficits.      Diagnostics     Lab Results   Labs Ordered and Resulted from Time of ED Arrival to Time of ED Departure   BASIC METABOLIC PANEL - Abnormal       Result Value    Sodium 134 (*)     Potassium 4.7      Chloride 99      Carbon Dioxide (CO2) 21 (*)     Anion Gap 14      Urea Nitrogen 24.4 (*)     Creatinine 0.95      GFR Estimate 84      Calcium 9.3      Glucose 152 (*)    D DIMER QUANTITATIVE - Abnormal    D-Dimer Quantitative 1.19 (*)    CBC WITH PLATELETS AND DIFFERENTIAL - Abnormal    WBC Count 13.8 (*)     RBC Count 4.74      Hemoglobin 13.2 (*)     Hematocrit 41.9      MCV 88      MCH 27.8      MCHC 31.5      RDW 15.9 (*)     Platelet Count 214      % Neutrophils 92      % Lymphocytes 4      % Monocytes 2      % Eosinophils 0      % Basophils 0      % Immature Granulocytes 2      NRBCs per 100 WBC 0   "    Absolute Neutrophils 12.8 (*)     Absolute Lymphocytes 0.5 (*)     Absolute Monocytes 0.3      Absolute Eosinophils 0.0      Absolute Basophils 0.0      Absolute Immature Granulocytes 0.2      Absolute NRBCs 0.0     TROPONIN T, HIGH SENSITIVITY - Normal    Troponin T, High Sensitivity 20     INFLUENZA A/B, RSV AND SARS-COV2 PCR - Normal    Influenza A PCR Negative      Influenza B PCR Negative      RSV PCR Negative      SARS CoV2 PCR Negative     TSH WITH FREE T4 REFLEX - Normal    TSH 1.20     MAGNESIUM - Normal    Magnesium 2.2     NT PROBNP INPATIENT - Normal    N terminal Pro BNP Inpatient 734     TROPONIN T, HIGH SENSITIVITY       Imaging   CT Chest Pulmonary Embolism w Contrast   Final Result   IMPRESSION:   1.  No evidence for pulmonary embolism.   2.  Bibasilar groundglass opacities suggesting pulmonary edema. Mild   cardiomegaly. Small bibasilar pleural fluid.   3.  A few mildly prominent thoracic lymph nodes.   4.  Mild bilateral peribronchial wall thickening may be as a result of   pulmonary edema versus a bronchitis.       AIDA CUADRA MD            SYSTEM ID:  [id number]       Lower Extremity Venous Duplex Bilateral    (Results Pending)       ECG results from 12/19/24   EKG 12-lead, tracing only     Value    Systolic Blood Pressure     Diastolic Blood Pressure     Ventricular Rate 116    Atrial Rate     NV Interval     QRS Duration 78        QTc 414    P Axis     R AXIS 81    T Axis -64    Interpretation ECG      Atrial fibrillation with rapid ventricular response  T wave abnormality, consider inferior ischemia  Abnormal ECG  No previous ECGs available  Confirmed by - EMERGENCY ROOM, PHYSICIAN ([id number]),  HATTIE SERRATO (Yasmany) on 12/19/2024 1:45:58 PM           Independent Interpretation   None    ED Course      Medications Administered   Medications   diltiazem (CARDIZEM) injection 20 mg (20 mg Intravenous $Given 12/19/24 1603)   iopamidol (ISOVUE-370) solution 120 mL (80 mLs Intravenous  $Given 12/19/24 1543)   Saline CT scan flush (80 mLs Intravenous $Given 12/19/24 1545)       Procedures   Procedures     Discussion of Management   Admitting Hospitalist, Dr. Mobley    ED Course   ED Course as of 12/19/24 1655   Thu Dec 19, 2024   1651 Consulted with Dr. Mobley with inpatient hospitalist service       Additional Documentation  None    Medical Decision Making / Diagnosis     CMS Diagnoses: None    MIPS   CT for PE was ordered because the patient is high risk for pulmonary embolism and the patient had an abnormal d-dimer.    [facility]   Marcio Renner is a 74 year old male who presents with new onset atrial fibrillation with RVR.  Noted on EKG at urgent care where patient was seen for exertional dyspnea and left-sided chest discomfort.  Upon arrival to ER, patient's heart rate noted to be around 130 bpm with A-fib confirmed on EKG.  Blood pressure is stable he is breathing comfortably on room air.  Bilateral lower extremity edema is present, pitting, L > R.  Lungs are clear to auscultation bilaterally.  Uncertain when atrial fibrillation started for patient as he has been having exertional dyspnea and lower extremity edema for roughly 2 weeks.  I do not feel that cardioversion is warranted given uncertainty and timing.  Bolus of diltiazem was given and patient's heart rate reduced to 80s to 100 bpm.  Blood pressure stable.  His heart rate remained stable in this rate without requirement for repeat bolus or drip.  Labs remarkable for elevated dimer of 1.19 prompting PE study.  He does have minor leukocytosis however I suspect this is secondary to chronic steroid use.  BNP not significantly elevated to suggest heart failure.  Initial troponin with mild elevation at 20, delta troponin pending.  CT PE study remarkable for pulmonary edema, no evidence of embolism.  Out of abundance of caution, bilateral lower extremity ultrasound was ordered to confirm DVT is not present.  Patient would benefit from  hospitalization for further cardiac workup.  I consulted with Dr. Mobley with inpatient hospitalist service who graciously accepts patient's admission.    Disposition   The patient was admitted to the hospital.     Diagnosis     ICD-10-CM    1. Atrial fibrillation with RVR (H)  I48.91       2. Acute pulmonary edema (H)  J81.0       3. History of giant cell arteritis  Z87.39            Discharge Medications   New Prescriptions    No medications on file         BEATRIS Dominguez Lauren R, PA-C  12/19/24 1650

## 2024-12-19 NOTE — H&P
Hospitalist Admission   History and Physical    CC: fatigue, SOB, new onset afib    HPI:   73 yo male with history of carotid artery stenosis, hypertension, prediabetes, and prostate cancer who presents to the ED for evaluation of shortness of breath. The patient reports he has been on prednisone for about a month for giant cell arteritis and has been tapering off of this. Today he noticed he had increased work of breathing and congestion.  He has also noted increased LE swelling.  He presented to Urgent Care where he was diagnosed with new onset a-fib.  The patient was instructed to go to the [facility] ED for further evaluation    On presentation to the ER, vital signs notable for systolic blood pressure 145, heart rate near 115 bpm, no fever, no hypoxia    Labs notable for white blood cell count near 14.  TSH normal.  BNP normal at 730.  Troponin normal.  Magnesium normal.  D-dimer was elevated at 1.2.    EKG was obtained which I personally reviewed showing irregular rhythm consistent with atrial fibrillation    Chest CT scan was obtained showing no evidence of pulm embolism.  However, there is bibasilar groundglass opacities suggesting pulmonary edema.    I spoke to the ER provider.  Plan is for admission to the hospital for rapid atrial fibrillation and pulmonary edema    PMH:  No past medical history on file.     Medications:  No current facility-administered medications for this encounter.     Current Outpatient Medications   Medication Sig Dispense Refill    docusate sodium (COLACE) 100 MG capsule Take 1 capsule (100 mg) by mouth 3 times daily as needed for constipation 20 capsule 0    oxyCODONE (ROXICODONE) 5 MG tablet Take 1 tablet (5 mg) by mouth every 6 hours as needed for pain 8 tablet 0        Allergies:     Allergies   Allergen Reactions    Atorvastatin Muscle Pain (Myalgia)        Family History:  noncontributory    Social History:      Review of Systems: Comprehensive greater than 10 point review  "systems otherwise negative besides that detailed above    Physical exam:  /73   Pulse 77   Temp 97.6  F (36.4  C) (Temporal)   Resp 19   Ht 1.676 m (5' 6\")   Wt 88.5 kg (195 lb)   SpO2 95%   BMI 31.47 kg/m     PSYCH: pleasant, oriented, No acute distress.  HEART:  Normal S1, S2 with no edema.  LUNGS:  Clear to auscultation, normal Respiratory effort.  ABDOMEN:  Soft, no hepatosplenomegaly, normal bowel sounds.  SKIN:  Dry to touch, No rash.     Pertinent laboratory and imaging data reviewed above in HPI         Impression:  1.  Rapid atrial fibrillation, new diagnosis  -Troponin normal.  No evidence of ACS  - received a single dose of IV diltiazem in ER with normalization of heart rate  -Start metoprolol 12.5 mg every 6 hours for rate management, convert to twice daily dosing when heart rate adequately controlled  -Check TTE  -Given [id number] risk factors, recommend anticoagulation for CVA prophylaxis.  Start Eliquis.    -Consult pharmacy liaison for DOAC pricing  -Cardiology consult.  Consider inpatient cardioversion if unable to adequately rate manage or if remains symptomatic vs outpatient cardioversion after 30 days of anticoagulation    2.  SOB due to pulmonary edema  - suspect acute diastolic heart failure due to rapid afib and hypervolemia related to prednisone use  - rule out systolic HF with TTE  - Lasix 20 mg IV every 12 hours    2.  Hypertension history  -Hold previous antihypertensive medications for now to allow titration of rate management medications for control of atrial fibrillation    3.  Hx of carotid artery stenosis  - being followed as outpatient; procedure being considered    4.  \"Pre\"diabetes mellitus  -Check hemoglobin A1c    5.  Leukocytosis, mild  - no fever  - suspect due to prednisone use and acute stress response from rapid afib    6.  Hx of giant cell arteritis  - on slow prednisone taper  - current dose is prednisone 35 mg daily    Spouse updated at bedside on " admit    CODE STATUS: Full code

## 2024-12-19 NOTE — ED TRIAGE NOTES
Pt c/o fatigue and dyspnea with exertion for the past week. Very short of breath today. Spoke with his rheumatologist and was told to go to the clinic to get checked out. Pt went to [facility] and was found to have new Afib. Pt also reports some swelling in lower legs.      Triage Assessment (Adult)       Row Name 12/19/24 1246          Triage Assessment    Airway WDL WDL        Respiratory WDL    Respiratory WDL --  short of breath with exertion for the past week        Skin Circulation/Temperature WDL    Skin Circulation/Temperature WDL WDL        Cardiac WDL    Cardiac WDL chest pain        Chest Pain Assessment    Chest Pain Location anterior chest, left     Character pressure     Duration today     Precipitating Factors physical exertion     Alleviating Factors rest        Peripheral/Neurovascular WDL    Peripheral Neurovascular WDL WDL        Cognitive/Neuro/Behavioral WDL    Cognitive/Neuro/Behavioral WDL WDL                      Complex Repair And Dorsal Nasal Flap Text: The defect edges were debeveled with a #15 scalpel blade.  The primary defect was closed partially with a complex linear closure.  Given the location of the remaining defect, shape of the defect and the proximity to free margins a dorsal nasal flap was deemed most appropriate for complete closure of the defect.  Using a sterile surgical marker, an appropriate flap was drawn incorporating the defect and placing the expected incisions within the relaxed skin tension lines where possible.    The area thus outlined was incised deep to adipose tissue with a #15 scalpel blade.  The skin margins were undermined to an appropriate distance in all directions utilizing iris scissors.

## 2024-12-19 NOTE — ED NOTES
[facility]  ED Nurse Handoff Report    ED Chief complaint: Shortness of Breath  . ED Diagnosis:   Final diagnoses:   Acute pulmonary edema (H)   Atrial fibrillation with RVR (H)       Allergies:   Allergies   Allergen Reactions    Atorvastatin Muscle Pain (Myalgia)       Code Status: Full Code    Activity level - Baseline/Home:  independent.  Activity Level - Current:   independent.   Lift room needed: No.   Bariatric: No   Needed: No   Isolation: No.   Infection: Not Applicable.     Respiratory status: Room air    Vital Signs (within 30 minutes):   Vitals:    12/19/24 1602 12/19/24 1609 12/19/24 1615 12/19/24 1630   BP: 122/47 90/75 104/73 116/89   Pulse: (!) 134 96 77 107   Resp: 19 21 19 23   Temp:       TempSrc:       SpO2: 97% 95% 95% 96%   Weight:       Height:           Cardiac Rhythm:  ,      Pain level:    Patient confused: No.   Patient Falls Risk: nonskid shoes/slippers when out of bed, patient and family education, and room door open.   Elimination Status: Has voided     Patient Report - Initial Complaint: Shortness of breath.   Focused Assessment: Marcio Renner is a 74 year old male with history of coronary artery disease, prostate cancer, carotid artery stenosis, hypertension, prediabetes, who presents with new onset atrial fibrillation.  Patient states that he has been on prednisone for roughly 1 month treating giant cell arteritis.  For the past week or two, he has noticed that he has been more out of breath with exertion. Went to urgent care today due to increased work of breathing and sinus pressure, was noted to be in atrial fibrillation so was sent to ER for further evaluation. Patient does not have history of this. He is not anticoagulated outside of baby aspirin once daily. Patient states that for the past week or so, he has noted bilateral lower extremity edema that is worsening along with intermittent calf pain bilaterally. States that if he takes deep  breath in, he has some left sided chest tightness. Denies any chest pain at rest.      Abnormal Results:   Labs Ordered and Resulted from Time of ED Arrival to Time of ED Departure   BASIC METABOLIC PANEL - Abnormal       Result Value    Sodium 134 (*)     Potassium 4.7      Chloride 99      Carbon Dioxide (CO2) 21 (*)     Anion Gap 14      Urea Nitrogen 24.4 (*)     Creatinine 0.95      GFR Estimate 84      Calcium 9.3      Glucose 152 (*)    D DIMER QUANTITATIVE - Abnormal    D-Dimer Quantitative 1.19 (*)    CBC WITH PLATELETS AND DIFFERENTIAL - Abnormal    WBC Count 13.8 (*)     RBC Count 4.74      Hemoglobin 13.2 (*)     Hematocrit 41.9      MCV 88      MCH 27.8      MCHC 31.5      RDW 15.9 (*)     Platelet Count 214      % Neutrophils 92      % Lymphocytes 4      % Monocytes 2      % Eosinophils 0      % Basophils 0      % Immature Granulocytes 2      NRBCs per 100 WBC 0      Absolute Neutrophils 12.8 (*)     Absolute Lymphocytes 0.5 (*)     Absolute Monocytes 0.3      Absolute Eosinophils 0.0      Absolute Basophils 0.0      Absolute Immature Granulocytes 0.2      Absolute NRBCs 0.0     TROPONIN T, HIGH SENSITIVITY - Normal    Troponin T, High Sensitivity 20     INFLUENZA A/B, RSV AND SARS-COV2 PCR - Normal    Influenza A PCR Negative      Influenza B PCR Negative      RSV PCR Negative      SARS CoV2 PCR Negative     TSH WITH FREE T4 REFLEX - Normal    TSH 1.20     MAGNESIUM - Normal    Magnesium 2.2     NT PROBNP INPATIENT - Normal    N terminal Pro BNP Inpatient 734     TROPONIN T, HIGH SENSITIVITY        CT Chest Pulmonary Embolism w Contrast   Final Result   IMPRESSION:   1.  No evidence for pulmonary embolism.   2.  Bibasilar groundglass opacities suggesting pulmonary edema. Mild   cardiomegaly. Small bibasilar pleural fluid.   3.  A few mildly prominent thoracic lymph nodes.   4.  Mild bilateral peribronchial wall thickening may be as a result of   pulmonary edema versus a bronchitis.       AIDA CUADRA,  MD            SYSTEM ID:  [id number]       Lower Extremity Venous Duplex Bilateral    (Results Pending)       Treatments provided: IV, labs, meds, imaging  Family Comments: present at bedside  OBS brochure/video discussed/provided to patient:  No  ED Medications:   Medications   diltiazem (CARDIZEM) injection 20 mg (20 mg Intravenous $Given 12/19/24 1603)   iopamidol (ISOVUE-370) solution 120 mL (80 mLs Intravenous $Given 12/19/24 1543)   Saline CT scan flush (80 mLs Intravenous $Given 12/19/24 1545)       Drips infusing:  No  For the majority of the shift this patient was Green.   Interventions performed were updated on plan of care and interventions.    Sepsis treatment initiated: No    Cares/treatment/interventions/medications to be completed following ED care: admission orders, cardiac monitoring    ED Nurse Name: Sara Elizalde RN  4:41 PM

## 2024-12-20 ENCOUNTER — APPOINTMENT (OUTPATIENT)
Dept: CARDIOLOGY | Facility: CLINIC | Age: 74
End: 2024-12-20
Attending: INTERNAL MEDICINE
Payer: COMMERCIAL

## 2024-12-20 VITALS
BODY MASS INDEX: 30.91 KG/M2 | TEMPERATURE: 97.8 F | DIASTOLIC BLOOD PRESSURE: 89 MMHG | SYSTOLIC BLOOD PRESSURE: 129 MMHG | HEIGHT: 66 IN | OXYGEN SATURATION: 97 % | RESPIRATION RATE: 18 BRPM | WEIGHT: 192.3 LBS | HEART RATE: 89 BPM

## 2024-12-20 VITALS
BODY MASS INDEX: 31.34 KG/M2 | OXYGEN SATURATION: 94 % | HEART RATE: 98 BPM | SYSTOLIC BLOOD PRESSURE: 104 MMHG | TEMPERATURE: 97.6 F | WEIGHT: 195 LBS | RESPIRATION RATE: 16 BRPM | HEIGHT: 66 IN | DIASTOLIC BLOOD PRESSURE: 89 MMHG

## 2024-12-20 LAB
ANION GAP SERPL CALCULATED.3IONS-SCNC: 11 MMOL/L (ref 7–15)
ATRIAL RATE - MUSE: 95 BPM
BUN SERPL-MCNC: 25.4 MG/DL (ref 8–23)
CALCIUM SERPL-MCNC: 9.3 MG/DL (ref 8.8–10.4)
CHLORIDE SERPL-SCNC: 101 MMOL/L (ref 98–107)
CREAT SERPL-MCNC: 1.27 MG/DL (ref 0.67–1.17)
DIASTOLIC BLOOD PRESSURE - MUSE: NORMAL MMHG
EGFRCR SERPLBLD CKD-EPI 2021: 59 ML/MIN/1.73M2
ERYTHROCYTE [DISTWIDTH] IN BLOOD BY AUTOMATED COUNT: 16.2 % (ref 10–15)
EST. AVERAGE GLUCOSE BLD GHB EST-MCNC: 140 MG/DL
GLUCOSE SERPL-MCNC: 105 MG/DL (ref 70–99)
HBA1C MFR BLD: 6.5 %
HCO3 SERPL-SCNC: 27 MMOL/L (ref 22–29)
HCT VFR BLD AUTO: 39.5 % (ref 40–53)
HGB BLD-MCNC: 12.4 G/DL (ref 13.3–17.7)
INR PPP: 1.26 (ref 0.85–1.15)
INTERPRETATION ECG - MUSE: NORMAL
LVEF ECHO: NORMAL
MAGNESIUM SERPL-MCNC: 2.3 MG/DL (ref 1.7–2.3)
MCH RBC QN AUTO: 27.8 PG (ref 26.5–33)
MCHC RBC AUTO-ENTMCNC: 31.4 G/DL (ref 31.5–36.5)
MCV RBC AUTO: 89 FL (ref 78–100)
P AXIS - MUSE: 67 DEGREES
PLATELET # BLD AUTO: 194 10E3/UL (ref 150–450)
POTASSIUM SERPL-SCNC: 4.4 MMOL/L (ref 3.4–5.3)
POTASSIUM SERPL-SCNC: 4.5 MMOL/L (ref 3.4–5.3)
PR INTERVAL - MUSE: 142 MS
QRS DURATION - MUSE: 82 MS
QT - MUSE: 340 MS
QTC - MUSE: 427 MS
R AXIS - MUSE: 71 DEGREES
RBC # BLD AUTO: 4.46 10E6/UL (ref 4.4–5.9)
SODIUM SERPL-SCNC: 139 MMOL/L (ref 135–145)
SYSTOLIC BLOOD PRESSURE - MUSE: NORMAL MMHG
T AXIS - MUSE: -34 DEGREES
VENTRICULAR RATE- MUSE: 95 BPM
WBC # BLD AUTO: 7.6 10E3/UL (ref 4–11)

## 2024-12-20 PROCEDURE — 93320 DOPPLER ECHO COMPLETE: CPT | Mod: 26 | Performed by: INTERNAL MEDICINE

## 2024-12-20 PROCEDURE — 93325 DOPPLER ECHO COLOR FLOW MAPG: CPT | Mod: 26 | Performed by: INTERNAL MEDICINE

## 2024-12-20 PROCEDURE — 250N000011 HC RX IP 250 OP 636: Performed by: INTERNAL MEDICINE

## 2024-12-20 PROCEDURE — 250N000009 HC RX 250: Performed by: INTERNAL MEDICINE

## 2024-12-20 PROCEDURE — 93325 DOPPLER ECHO COLOR FLOW MAPG: CPT

## 2024-12-20 PROCEDURE — 85018 HEMOGLOBIN: CPT | Performed by: INTERNAL MEDICINE

## 2024-12-20 PROCEDURE — 370N000017 HC ANESTHESIA TECHNICAL FEE, PER MIN

## 2024-12-20 PROCEDURE — 36415 COLL VENOUS BLD VENIPUNCTURE: CPT | Performed by: INTERNAL MEDICINE

## 2024-12-20 PROCEDURE — 85610 PROTHROMBIN TIME: CPT | Performed by: INTERNAL MEDICINE

## 2024-12-20 PROCEDURE — 83036 HEMOGLOBIN GLYCOSYLATED A1C: CPT | Performed by: INTERNAL MEDICINE

## 2024-12-20 PROCEDURE — 93270 REMOTE 30 DAY ECG REV/REPORT: CPT

## 2024-12-20 PROCEDURE — 80048 BASIC METABOLIC PNL TOTAL CA: CPT | Performed by: INTERNAL MEDICINE

## 2024-12-20 PROCEDURE — 93272 ECG/REVIEW INTERPRET ONLY: CPT | Performed by: INTERNAL MEDICINE

## 2024-12-20 PROCEDURE — G0378 HOSPITAL OBSERVATION PER HR: HCPCS

## 2024-12-20 PROCEDURE — 99239 HOSP IP/OBS DSCHRG MGMT >30: CPT | Performed by: HOSPITALIST

## 2024-12-20 PROCEDURE — 250N000013 HC RX MED GY IP 250 OP 250 PS 637: Performed by: INTERNAL MEDICINE

## 2024-12-20 PROCEDURE — 83735 ASSAY OF MAGNESIUM: CPT | Performed by: INTERNAL MEDICINE

## 2024-12-20 PROCEDURE — 99204 OFFICE O/P NEW MOD 45 MIN: CPT | Mod: 25 | Performed by: INTERNAL MEDICINE

## 2024-12-20 PROCEDURE — 84132 ASSAY OF SERUM POTASSIUM: CPT | Performed by: INTERNAL MEDICINE

## 2024-12-20 PROCEDURE — 93312 ECHO TRANSESOPHAGEAL: CPT | Mod: 26 | Performed by: INTERNAL MEDICINE

## 2024-12-20 PROCEDURE — 92960 CARDIOVERSION ELECTRIC EXT: CPT

## 2024-12-20 RX ORDER — NALOXONE HYDROCHLORIDE 0.4 MG/ML
0.4 INJECTION, SOLUTION INTRAMUSCULAR; INTRAVENOUS; SUBCUTANEOUS
Status: DISCONTINUED | OUTPATIENT
Start: 2024-12-20 | End: 2024-12-20 | Stop reason: HOSPADM

## 2024-12-20 RX ORDER — AMIODARONE HYDROCHLORIDE 200 MG/1
200 TABLET ORAL 3 TIMES DAILY
Status: DISCONTINUED | OUTPATIENT
Start: 2024-12-20 | End: 2024-12-20 | Stop reason: HOSPADM

## 2024-12-20 RX ORDER — FENTANYL CITRATE 50 UG/ML
25 INJECTION, SOLUTION INTRAMUSCULAR; INTRAVENOUS
Status: DISCONTINUED | OUTPATIENT
Start: 2024-12-20 | End: 2024-12-20 | Stop reason: HOSPADM

## 2024-12-20 RX ORDER — FENTANYL CITRATE 50 UG/ML
INJECTION, SOLUTION INTRAMUSCULAR; INTRAVENOUS
Status: COMPLETED
Start: 2024-12-20 | End: 2024-12-20

## 2024-12-20 RX ORDER — POTASSIUM CHLORIDE 1500 MG/1
20 TABLET, EXTENDED RELEASE ORAL
OUTPATIENT
Start: 2024-12-20

## 2024-12-20 RX ORDER — FUROSEMIDE 20 MG/1
20 TABLET ORAL DAILY PRN
Status: DISCONTINUED | OUTPATIENT
Start: 2024-12-20 | End: 2024-12-20 | Stop reason: HOSPADM

## 2024-12-20 RX ORDER — METOPROLOL TARTRATE 1 MG/ML
5 INJECTION, SOLUTION INTRAVENOUS EVERY 6 HOURS PRN
Status: DISCONTINUED | OUTPATIENT
Start: 2024-12-20 | End: 2024-12-20 | Stop reason: HOSPADM

## 2024-12-20 RX ORDER — FLUMAZENIL 0.1 MG/ML
0.2 INJECTION, SOLUTION INTRAVENOUS
Status: DISCONTINUED | OUTPATIENT
Start: 2024-12-20 | End: 2024-12-20 | Stop reason: HOSPADM

## 2024-12-20 RX ORDER — METOPROLOL SUCCINATE 25 MG/1
25 TABLET, EXTENDED RELEASE ORAL DAILY
Status: DISCONTINUED | OUTPATIENT
Start: 2024-12-20 | End: 2024-12-20 | Stop reason: HOSPADM

## 2024-12-20 RX ORDER — APREMILAST 30 MG/1
1 TABLET, FILM COATED ORAL 2 TIMES DAILY
COMMUNITY
Start: 2024-09-09

## 2024-12-20 RX ORDER — LIDOCAINE 50 MG/G
OINTMENT TOPICAL ONCE
Status: DISCONTINUED | OUTPATIENT
Start: 2024-12-20 | End: 2024-12-20 | Stop reason: HOSPADM

## 2024-12-20 RX ORDER — TRIAMCINOLONE ACETONIDE 1 MG/G
CREAM TOPICAL 2 TIMES DAILY
COMMUNITY

## 2024-12-20 RX ORDER — MAGNESIUM SULFATE HEPTAHYDRATE 40 MG/ML
2 INJECTION, SOLUTION INTRAVENOUS
OUTPATIENT
Start: 2024-12-20

## 2024-12-20 RX ORDER — TADALAFIL 5 MG/1
1 TABLET ORAL DAILY
COMMUNITY
Start: 2024-12-17

## 2024-12-20 RX ORDER — NITROGLYCERIN 0.4 MG/1
0.4 TABLET SUBLINGUAL EVERY 5 MIN PRN
Status: ON HOLD | COMMUNITY
End: 2024-12-20

## 2024-12-20 RX ORDER — ROSUVASTATIN CALCIUM 20 MG/1
1 TABLET, COATED ORAL DAILY
Status: ON HOLD | COMMUNITY
Start: 2024-10-07 | End: 2024-12-20

## 2024-12-20 RX ORDER — NALOXONE HYDROCHLORIDE 0.4 MG/ML
0.2 INJECTION, SOLUTION INTRAMUSCULAR; INTRAVENOUS; SUBCUTANEOUS
Status: DISCONTINUED | OUTPATIENT
Start: 2024-12-20 | End: 2024-12-20 | Stop reason: HOSPADM

## 2024-12-20 RX ORDER — PREDNISONE 20 MG/1
TABLET ORAL
Status: ON HOLD | COMMUNITY
Start: 2024-11-12 | End: 2024-12-20

## 2024-12-20 RX ORDER — CLOBETASOL PROPIONATE 0.5 MG/G
OINTMENT TOPICAL
COMMUNITY
Start: 2024-10-03

## 2024-12-20 RX ORDER — MAGNESIUM SULFATE HEPTAHYDRATE 40 MG/ML
2 INJECTION, SOLUTION INTRAVENOUS
Status: DISCONTINUED | OUTPATIENT
Start: 2024-12-20 | End: 2024-12-20 | Stop reason: HOSPADM

## 2024-12-20 RX ORDER — LIDOCAINE HYDROCHLORIDE 40 MG/ML
1.5 SOLUTION TOPICAL ONCE
Status: DISCONTINUED | OUTPATIENT
Start: 2024-12-20 | End: 2024-12-20 | Stop reason: HOSPADM

## 2024-12-20 RX ORDER — FUROSEMIDE 20 MG/1
20 TABLET ORAL DAILY PRN
Qty: 30 TABLET | Refills: 1 | Status: SHIPPED | OUTPATIENT
Start: 2024-12-20

## 2024-12-20 RX ORDER — AMIODARONE HYDROCHLORIDE 200 MG/1
200 TABLET ORAL DAILY
Qty: 30 TABLET | Refills: 0 | Status: SHIPPED | OUTPATIENT
Start: 2025-01-03

## 2024-12-20 RX ORDER — CLOPIDOGREL BISULFATE 75 MG/1
75 TABLET ORAL DAILY
Status: ON HOLD | COMMUNITY
Start: 2024-11-10 | End: 2024-12-20

## 2024-12-20 RX ORDER — METOPROLOL SUCCINATE 25 MG/1
25 TABLET, EXTENDED RELEASE ORAL DAILY
Qty: 30 TABLET | Refills: 1 | Status: SHIPPED | OUTPATIENT
Start: 2024-12-21 | End: 2024-12-23

## 2024-12-20 RX ORDER — AMIODARONE HYDROCHLORIDE 100 MG/1
200 TABLET ORAL DAILY
Status: DISCONTINUED | OUTPATIENT
Start: 2025-01-03 | End: 2024-12-20 | Stop reason: HOSPADM

## 2024-12-20 RX ORDER — LOSARTAN POTASSIUM 25 MG/1
12.5 TABLET ORAL DAILY
COMMUNITY
Start: 2024-12-20

## 2024-12-20 RX ORDER — DEXTROSE MONOHYDRATE 25 G/50ML
9.5 INJECTION, SOLUTION INTRAVENOUS
Status: DISCONTINUED | OUTPATIENT
Start: 2024-12-20 | End: 2024-12-20 | Stop reason: HOSPADM

## 2024-12-20 RX ORDER — POTASSIUM CHLORIDE 1500 MG/1
40 TABLET, EXTENDED RELEASE ORAL
Status: DISCONTINUED | OUTPATIENT
Start: 2024-12-20 | End: 2024-12-20 | Stop reason: HOSPADM

## 2024-12-20 RX ORDER — ACETAMINOPHEN 325 MG/1
650 TABLET ORAL EVERY 4 HOURS PRN
COMMUNITY
Start: 2024-12-20

## 2024-12-20 RX ORDER — ROSUVASTATIN CALCIUM 40 MG/1
40 TABLET, COATED ORAL DAILY
COMMUNITY
Start: 2024-06-23

## 2024-12-20 RX ORDER — LIDOCAINE 40 MG/G
CREAM TOPICAL
Status: DISCONTINUED | OUTPATIENT
Start: 2024-12-20 | End: 2024-12-20 | Stop reason: HOSPADM

## 2024-12-20 RX ORDER — ALBUTEROL SULFATE 90 UG/1
1-2 INHALANT RESPIRATORY (INHALATION) EVERY 6 HOURS PRN
COMMUNITY
Start: 2024-06-14

## 2024-12-20 RX ORDER — VALACYCLOVIR HYDROCHLORIDE 500 MG/1
1 TABLET, FILM COATED ORAL DAILY
COMMUNITY
Start: 2024-07-18

## 2024-12-20 RX ORDER — POTASSIUM CHLORIDE 1500 MG/1
40 TABLET, EXTENDED RELEASE ORAL
OUTPATIENT
Start: 2024-12-20

## 2024-12-20 RX ORDER — LIDOCAINE HYDROCHLORIDE 20 MG/ML
SOLUTION OROPHARYNGEAL
Status: COMPLETED
Start: 2024-12-20 | End: 2024-12-20

## 2024-12-20 RX ORDER — SILDENAFIL 100 MG/1
100 TABLET, FILM COATED ORAL DAILY PRN
COMMUNITY
Start: 2024-10-16

## 2024-12-20 RX ORDER — AMIODARONE HYDROCHLORIDE 200 MG/1
200 TABLET ORAL 3 TIMES DAILY
Qty: 41 TABLET | Refills: 0 | Status: SHIPPED | OUTPATIENT
Start: 2024-12-20

## 2024-12-20 RX ORDER — BENZOCAINE/MENTHOL 6 MG-10 MG
1 LOZENGE MUCOUS MEMBRANE 3 TIMES DAILY PRN
Status: DISCONTINUED | OUTPATIENT
Start: 2024-12-20 | End: 2024-12-20 | Stop reason: HOSPADM

## 2024-12-20 RX ORDER — PREDNISONE 5 MG/1
TABLET ORAL
COMMUNITY
Start: 2024-11-20 | End: 2025-01-22

## 2024-12-20 RX ORDER — LOSARTAN POTASSIUM 25 MG/1
1 TABLET ORAL DAILY
Status: ON HOLD | COMMUNITY
Start: 2024-07-18 | End: 2024-12-20

## 2024-12-20 RX ORDER — GLYCOPYRROLATE 0.2 MG/ML
INJECTION, SOLUTION INTRAMUSCULAR; INTRAVENOUS
Status: COMPLETED
Start: 2024-12-20 | End: 2024-12-20

## 2024-12-20 RX ORDER — FLUTICASONE PROPIONATE AND SALMETEROL 250; 50 UG/1; UG/1
1 POWDER RESPIRATORY (INHALATION) 2 TIMES DAILY
COMMUNITY
Start: 2024-11-12

## 2024-12-20 RX ORDER — ACETAMINOPHEN 325 MG/1
650 TABLET ORAL EVERY 4 HOURS PRN
Status: DISCONTINUED | OUTPATIENT
Start: 2024-12-20 | End: 2024-12-20 | Stop reason: HOSPADM

## 2024-12-20 RX ORDER — MAGNESIUM HYDROXIDE/ALUMINUM HYDROXICE/SIMETHICONE 120; 1200; 1200 MG/30ML; MG/30ML; MG/30ML
30 SUSPENSION ORAL EVERY 8 HOURS PRN
Status: DISCONTINUED | OUTPATIENT
Start: 2024-12-20 | End: 2024-12-20 | Stop reason: HOSPADM

## 2024-12-20 RX ORDER — ROSUVASTATIN CALCIUM 20 MG/1
40 TABLET, COATED ORAL AT BEDTIME
Status: DISCONTINUED | OUTPATIENT
Start: 2024-12-20 | End: 2024-12-20 | Stop reason: HOSPADM

## 2024-12-20 RX ORDER — SULFAMETHOXAZOLE AND TRIMETHOPRIM 800; 160 MG/1; MG/1
1 TABLET ORAL
COMMUNITY
Start: 2024-11-20 | End: 2025-02-18

## 2024-12-20 RX ORDER — GLYCOPYRROLATE 0.2 MG/ML
0.1 INJECTION, SOLUTION INTRAMUSCULAR; INTRAVENOUS ONCE
Status: COMPLETED | OUTPATIENT
Start: 2024-12-20 | End: 2024-12-20

## 2024-12-20 RX ORDER — LIDOCAINE HYDROCHLORIDE 20 MG/ML
15 SOLUTION OROPHARYNGEAL ONCE
Status: COMPLETED | OUTPATIENT
Start: 2024-12-20 | End: 2024-12-20

## 2024-12-20 RX ORDER — ASPIRIN 81 MG/1
81 TABLET ORAL DAILY
Status: DISCONTINUED | OUTPATIENT
Start: 2024-12-20 | End: 2024-12-20

## 2024-12-20 RX ORDER — POTASSIUM CHLORIDE 1500 MG/1
20 TABLET, EXTENDED RELEASE ORAL
Status: DISCONTINUED | OUTPATIENT
Start: 2024-12-20 | End: 2024-12-20 | Stop reason: HOSPADM

## 2024-12-20 RX ORDER — AMOXICILLIN 500 MG/1
4 CAPSULE ORAL
COMMUNITY
Start: 2024-10-24

## 2024-12-20 RX ADMIN — LIDOCAINE HYDROCHLORIDE 15 ML: 20 SOLUTION ORAL at 10:31

## 2024-12-20 RX ADMIN — FENTANYL CITRATE 50 MCG: 50 INJECTION, SOLUTION INTRAMUSCULAR; INTRAVENOUS at 10:54

## 2024-12-20 RX ADMIN — MIDAZOLAM 2 MG: 1 INJECTION INTRAMUSCULAR; INTRAVENOUS at 10:56

## 2024-12-20 RX ADMIN — MIDAZOLAM 1 MG: 1 INJECTION INTRAMUSCULAR; INTRAVENOUS at 10:59

## 2024-12-20 RX ADMIN — GLYCOPYRROLATE 0.1 MG: 0.2 INJECTION, SOLUTION INTRAMUSCULAR; INTRAVENOUS at 10:30

## 2024-12-20 RX ADMIN — AMIODARONE HYDROCHLORIDE 200 MG: 200 TABLET ORAL at 14:15

## 2024-12-20 RX ADMIN — METOPROLOL TARTRATE 12.5 MG: 25 TABLET, FILM COATED ORAL at 04:32

## 2024-12-20 RX ADMIN — TOPICAL ANESTHETIC 1 ML: 200 SPRAY DENTAL; PERIODONTAL at 10:44

## 2024-12-20 RX ADMIN — ASPIRIN 81 MG: 81 TABLET, COATED ORAL at 09:37

## 2024-12-20 RX ADMIN — APIXABAN 5 MG: 5 TABLET, FILM COATED ORAL at 09:23

## 2024-12-20 RX ADMIN — METOPROLOL SUCCINATE 25 MG: 25 TABLET, EXTENDED RELEASE ORAL at 13:46

## 2024-12-20 RX ADMIN — LIDOCAINE HYDROCHLORIDE 15 ML: 20 SOLUTION OROPHARYNGEAL at 10:31

## 2024-12-20 ASSESSMENT — ACTIVITIES OF DAILY LIVING (ADL)
ADLS_ACUITY_SCORE: 35
ADLS_ACUITY_SCORE: 41
ADLS_ACUITY_SCORE: 35

## 2024-12-20 NOTE — PROGRESS NOTES
ROOM # 212-1    Living Situation (if not independent, order SW consult):  Facility name:  : Laurel Renner (Spouse)  [phone]      Activity level at baseline: independent   Activity level on admit: SBA    Who will be transporting you at discharge: Laurel    Patient registered to observation; given Patient Bill of Rights; given the opportunity to ask questions about observation status and their plan of care.  Patient has been oriented to the observation room, bathroom and call light is in place.    Discussed discharge goals and expectations with patient/family.

## 2024-12-20 NOTE — PROGRESS NOTES
Cardiology progress note:    - Now status post KATE/DCCV with restoration of sinus rhythm  - KATE demonstrates LVEF 35 to 40% with global hypokinesis, moderately reduced RV function, no significant valvular abnormalities  - Patient feels well, back to baseline  - Patient wife would like to go home for the holiday weekend, given clinical improvement and stability I think this is reasonable.  Will start a short course of amiodarone, anticipate 3 months duration, discussed possible side effects including but not limited to toxicity to eyes, thyroid, liver, lungs, skin.  - Continue apixaban 5 mg twice daily, advised to discontinue fish oil supplement due to increased risk of bleeding.  Recommend continuing aspirin, weighing risks/benefits, due to history of carotid artery stenosis and prior CABG.   - Please discharge with furosemide 20 mg daily as needed for abnormal weight gain or other signs/symptoms of fluid overload  - Cardiac event monitor  - Start metoprolol succinate 25 mg daily  - Continue rosuvastatin  - Decrease home regimen of losartan 25 mg daily to 12.5 mg daily  - Cardiac rehab  - BMP in 2 weeks with RN BP check  - Follow-up in cardiology clinic ordered in discharge navigator.  Will plan to transition losartan to Entresto, start SGLT2i, spironolactone if BP allows.  Tentatively will plan on TTE in 2-3 months, if LV function still reduced, then nuclear stress test

## 2024-12-20 NOTE — PHARMACY-ADMISSION MEDICATION HISTORY
Pharmacist Admission Medication History    Admission medication history is complete. The information provided in this note is only as accurate as the sources available at the time of the update.    Information Source(s): Patient and CareEverywhere/SureScripts via in-person    Pertinent Information: Patient states that he was prescribed clopidogrel but has not started taking it yet. He is currently on the 7 tablet (35mg) daily step of the prednisone taper.     Changes made to PTA medication list:  Added: All  Deleted: None  Changed: None    Allergies reviewed with patient and updates made in EHR: yes    Medication History Completed By: Elie Shaw [facility] 12/20/2024 8:44 AM    PTA Med List   Medication Sig Last Dose/Taking    albuterol (PROAIR HFA/PROVENTIL HFA/VENTOLIN HFA) 108 (90 Base) MCG/ACT inhaler Inhale 1-2 puffs into the lungs every 6 hours as needed for shortness of breath. Past Month    amoxicillin (AMOXIL) 500 MG capsule Take 4 capsules by mouth. 1 hour prior to dental appointments 10/16/2024    clobetasol (TEMOVATE) 0.05 % external ointment Apply topically twice a day to affected areas on trunk and extremities for up to 2 weeks.  Take a 3-5 day break and restart 2 weeks as needed More than a month    fluticasone-salmeterol (ADVAIR) 250-50 MCG/ACT inhaler Inhale 1 puff into the lungs 2 times daily. 12/19/2024 Morning    losartan (COZAAR) 25 MG tablet Take 1 tablet by mouth daily. 12/19/2024 Morning    OTEZLA 30 MG tablet Take 1 tablet by mouth 2 times daily. 12/19/2024 Morning    predniSONE (DELTASONE) 5 MG tablet Take 10 Tablets (50 mg) by mouth daily for 7 days, THEN 8 Tablets (40 mg) daily for 7 days, THEN 7 Tablets (35 mg) daily for 7 days, THEN 6 Tablets (30 mg) daily for 7 days, THEN 5 Tablets (25 mg) daily for 7 days, THEN 4 Tablets (20 mg) daily for 28 days. 12/19/2024 Morning    rosuvastatin (CRESTOR) 40 MG tablet Take 40 mg by mouth daily. 12/19/2024 Morning    sildenafil (VIAGRA) 100 MG tablet  Take 100 mg by mouth daily as needed. Past Month    sulfamethoxazole-trimethoprim (BACTRIM DS) 800-160 MG tablet Take 1 tablet by mouth Every Mon, Wed, Fri Morning. 12/18/2024 Morning    tadalafil (CIALIS) 5 MG tablet Take 1 tablet by mouth daily. 12/19/2024 Morning    triamcinolone (KENALOG) 0.1 % external cream Apply topically 2 times daily. Past Month    valACYclovir (VALTREX) 500 MG tablet Take 1 tablet by mouth daily. 12/19/2024 Morning

## 2024-12-20 NOTE — PLAN OF CARE
Discharge instructions given, iv removed. Patient walked to vehicle with wife. Zio patch in place. Filled meds given for home use.

## 2024-12-20 NOTE — CONSULTS
Patient has [employer]BS ([employer]) through an employer.    Xarelto/Eliquis:  $0/mo.     Jardiance/Farxiga: $0/mo.    Entresto: $0/mo.    Nya Jorge  Pharmacy Technician/Liaison, Discharge Pharmacy   [phone] (voice or text)  [email or web address]  Pharmacy test claims are estimates and may not reflect final costs.   Suggested alternatives aim to be cost-effective but may not be therapeutically equivalent as this consult is informational and does not constitute medical advice.   Clinical decisions should be made by qualified healthcare providers.

## 2024-12-20 NOTE — PROCEDURES
[facility]    Procedure: *KATE with Cardioversion    Date/Time: 12/20/2024 11:45 AM    Performed by: Girma Macias MD  Authorized by: Girma Macias MD      UNIVERSAL PROTOCOL   Site Marked: NA  Prior Images Obtained and Reviewed:  Yes  Required items: Required blood products, implants, devices and special equipment available    Patient identity confirmed:  Verbally with patient  Patient was reevaluated immediately before administering moderate or deep sedation or anesthesia  Confirmation Checklist:  Patient's identity using two indicators  Time out: Immediately prior to the procedure a time out was called    Universal Protocol: the Joint Commission Universal Protocol was followed    Preparation: Patient was prepped and draped in usual sterile fashion    Anesthesia was administered and monitored by anesthesiology.  See anesthesia documentation for details.     ANESTHESIA    Anesthesia was administered and monitored by anesthesiology.  See anesthesia documentation for details.    SEDATION  Patient Sedated: Yes    Sedation Type:  Moderate (conscious) sedation  Vital signs: Vital signs monitored during sedation      PROCEDURE  Describe Procedure: DCCV  Patient Tolerance:  Patient tolerated the procedure well with no immediate complications

## 2024-12-20 NOTE — PLAN OF CARE
PRIMARY DIAGNOSIS: Acute Pulmonary Edema  OUTPATIENT/OBSERVATION GOALS TO BE MET BEFORE DISCHARGE:  1. Ruled out acute coronary syndrome (negative or stable Troponin):  Yes  2. Pain Status: Pain free.  3. Appropriate provocative testing performed: Yes  -- Interpretation of cardiac rhythm per telemetry tech: Afib    4. Cleared by Consultants (if applicable):No  5. Return to near baseline physical activity: Yes  Discharge Planner Nurse   Safe discharge environment identified: Yes  Barriers to discharge: Yes       Entered by: Daniela Marquez RN 12/20/2024 11:46 AM     Please review provider order for any additional goals.   Nurse to notify provider when observation goals have been met and patient is ready for discharge.      Goal Outcome Evaluation:      Plan of Care Reviewed With: patient    Overall Patient Progress: no changeOverall Patient Progress: no change    Outcome Evaluation: PT is a/ox4, pt refusing fall precuation bundle so is Indep in room. HR per tele tech is 110-130 hwoever on our machine it reads in the 50-80's. PT has AFIB per tele. Pt made NPO for cardioversion w/Cards. cARDIOLOGY FOLLOWING. PT R.ac is SL.      Problem: Adult Inpatient Plan of Care  Goal: Plan of Care Review  Description: The Plan of Care Review/Shift note should be completed every shift.  The Outcome Evaluation is a brief statement about your assessment that the patient is improving, declining, or no change.  This information will be displayed automatically on your shift  note.  12/20/2024 1145 by Daniela Marquez RN  Outcome: Progressing  Flowsheets (Taken 12/20/2024 1145)  Plan of Care Reviewed With: patient  Overall Patient Progress: no change  12/20/2024 0955 by Daniela Marquez RN  Outcome: Progressing  Flowsheets (Taken 12/20/2024 0955)  Outcome Evaluation: PT is a/ox4, pt refusing fall precuation bundle so is Indep in room. HR per tele tech is 110-130 hwoever on our machine it reads in the 50-80's. PT has AFIB per tele. Pt  "made NPO for cardioversion w/Cards. cARDIOLOGY FOLLOWING. PT R.ac is SL.  Plan of Care Reviewed With: patient  Overall Patient Progress: no change  Goal: Patient-Specific Goal (Individualized)  Description: You can add care plan individualizations to a care plan. Examples of Individualization might be:  \"Parent requests to be called daily at 9am for status\", \"I have a hard time hearing out of my right ear\", or \"Do not touch me to wake me up as it startles  me\".  12/20/2024 1145 by Daniela Marquez RN  Outcome: Progressing  12/20/2024 0955 by Daniela Marquez RN  Outcome: Progressing  Goal: Absence of Hospital-Acquired Illness or Injury  12/20/2024 1145 by Daniela Marquez RN  Outcome: Progressing  12/20/2024 0955 by Daniela Marquez RN  Outcome: Progressing  Intervention: Identify and Manage Fall Risk  Recent Flowsheet Documentation  Taken 12/20/2024 0800 by Daniela Marquez RN  Safety Promotion/Fall Prevention: safety round/check completed  Goal: Optimal Comfort and Wellbeing  12/20/2024 1145 by Daniela Marquez RN  Outcome: Progressing  12/20/2024 0955 by Daniela Marquez RN  Outcome: Progressing  Goal: Readiness for Transition of Care  12/20/2024 1145 by Daniela Marquez RN  Outcome: Progressing  12/20/2024 0955 by Daniela Marquez RN  Outcome: Progressing     Problem: Chest Pain  Goal: Resolution of Chest Pain Symptoms  12/20/2024 1145 by Daniela Marquez RN  Outcome: Progressing  12/20/2024 0955 by Dainela Marquez RN  Outcome: Progressing     "

## 2024-12-20 NOTE — CARE PLAN
KATE and cardioversion performed without complication. AF converted to SR with PACs with 120J.  Pt tolerated procedure well, VS returned to baseline.  Pt transferred back to PCU; report given to receiving nurse Marti PENN

## 2024-12-20 NOTE — PLAN OF CARE
PRIMARY DIAGNOSIS: CHEST PAIN  OUTPATIENT/OBSERVATION GOALS TO BE MET BEFORE DISCHARGE:  1. Ruled out acute coronary syndrome (negative or stable Troponin):  Yes  2. Pain Status: Pain free.  3. Appropriate provocative testing performed: Yes  - Interpretation of cardiac rhythm per telemetry tech: SR W/PAC's    4. Cleared by Consultants (if applicable):Yes  5. Return to near baseline physical activity: Yes  Discharge Planner Nurse   Safe discharge environment identified: Yes  Barriers to discharge: No       Entered by: Daniela Marquez RN 12/20/2024 2:00 PM     Please review provider order for any additional goals.   Nurse to notify provider when observation goals have been met and patient is ready for discharge.      Goal Outcome Evaluation:      Plan of Care Reviewed With: patient, spouse    Overall Patient Progress: improvingOverall Patient Progress: improving    Outcome Evaluation: PT is a/ox4, pt refusing fall precaution bundle thus is INdep in room, Tele tech says SR. Dennisudled meds provided, paged cardiopulmonary for placment of cardiac monitoring on an OP basis before D/c today. Wife at bedside. CARDS following      Problem: Adult Inpatient Plan of Care  Goal: Plan of Care Review  Description: The Plan of Care Review/Shift note should be completed every shift.  The Outcome Evaluation is a brief statement about your assessment that the patient is improving, declining, or no change.  This information will be displayed automatically on your shift  note.  12/20/2024 1359 by Daniela Marquez RN  Outcome: Met  Flowsheets (Taken 12/20/2024 1359)  Outcome Evaluation: PT is a/ox4, pt refusing fall precaution bundle thus is INdep in room, Tele tech says SR. Dennisudled meds provided, paged cardiopulmonary for placment of cardiac monitoring on an OP basis before D/c today. Wife at bedside. CARDS following  Plan of Care Reviewed With:   patient   spouse  Overall Patient Progress: improving  12/20/2024 1358 by Jimym  "Daniela ANGEL RN  Outcome: Progressing  Flowsheets (Taken 12/20/2024 1358)  Outcome Evaluation: PT is a/ox4, pt refusing fall precaution bundle thus is INdep in room, Tele tech says SR. Scheudled meds provided, paged cardiopulmonary for placment of cardiac monitoring on an OP basis before D/c today. Wife at bedside. CARDS following  Plan of Care Reviewed With:   patient   spouse  Overall Patient Progress: improving  12/20/2024 1145 by Daniela Marquez RN  Outcome: Progressing  Flowsheets (Taken 12/20/2024 1145)  Plan of Care Reviewed With: patient  Overall Patient Progress: no change  12/20/2024 0955 by Daniela Marquez RN  Outcome: Progressing  Flowsheets (Taken 12/20/2024 0955)  Outcome Evaluation: PT is a/ox4, pt refusing fall precuation bundle so is Indep in room. HR per tele tech is 110-130 hwoever on our machine it reads in the 50-80's. PT has AFIB per tele. Pt made NPO for cardioversion w/Cards. cARDIOLOGY FOLLOWING. PT R.ac is SL.  Plan of Care Reviewed With: patient  Overall Patient Progress: no change  Goal: Patient-Specific Goal (Individualized)  Description: You can add care plan individualizations to a care plan. Examples of Individualization might be:  \"Parent requests to be called daily at 9am for status\", \"I have a hard time hearing out of my right ear\", or \"Do not touch me to wake me up as it startles  me\".  12/20/2024 1359 by Daniela Marquez RN  Outcome: Met  12/20/2024 1358 by Daniela Marquez RN  Outcome: Progressing  12/20/2024 1145 by Daniela Marquez RN  Outcome: Progressing  12/20/2024 0955 by Daniela Marquez RN  Outcome: Progressing  Goal: Absence of Hospital-Acquired Illness or Injury  12/20/2024 1359 by Daniela Marquez RN  Outcome: Met  12/20/2024 1358 by Daniela Marquez RN  Outcome: Progressing  12/20/2024 1145 by Daniela Marquez RN  Outcome: Progressing  12/20/2024 0955 by Daniela Marquez, RN  Outcome: Progressing  Intervention: Identify and Manage Fall Risk  Recent Flowsheet " Documentation  Taken 12/20/2024 0800 by Daniela Marquez RN  Safety Promotion/Fall Prevention: safety round/check completed  Goal: Optimal Comfort and Wellbeing  12/20/2024 1359 by Daniela Marquez RN  Outcome: Met  12/20/2024 1358 by Daniela Marquez RN  Outcome: Progressing  12/20/2024 1145 by Daniela Marquez RN  Outcome: Progressing  12/20/2024 0955 by Daniela Marquez RN  Outcome: Progressing  Goal: Readiness for Transition of Care  12/20/2024 1359 by Daniela Marquez RN  Outcome: Met  12/20/2024 1358 by Daniela Marquez RN  Outcome: Progressing  12/20/2024 1145 by Dnaiela Marquez RN  Outcome: Progressing  12/20/2024 0955 by Daniela Marquez RN  Outcome: Progressing     Problem: Chest Pain  Goal: Resolution of Chest Pain Symptoms  12/20/2024 1359 by Daniela Marquez RN  Outcome: Met  12/20/2024 1358 by Daniela Marquez RN  Outcome: Progressing  12/20/2024 1145 by Daniela Marquez RN  Outcome: Progressing  12/20/2024 0955 by Daniela Marquez RN  Outcome: Progressing

## 2024-12-20 NOTE — PLAN OF CARE
PRIMARY DIAGNOSIS: Pulmonary EdEMA  OUTPATIENT/OBSERVATION GOALS TO BE MET BEFORE DISCHARGE:  1. Ruled out acute coronary syndrome (negative or stable Troponin):  yES  2. Pain Status: Pain free.  - Interpretation of cardiac rhythm per telemetry tech: afib    4. Cleared by Consultants (if applicable):No  5. Return to near baseline physical activity: Yes  Discharge Planner Nurse   Safe discharge environment identified: Yes  Barriers to discharge: Yes       Entered by: Daniela Marquez RN 12/20/2024 9:58 AM     Please review provider order for any additional goals.   Nurse to notify provider when observation goals have been met and patient is ready for discharge.      Goal Outcome Evaluation:      Plan of Care Reviewed With: patient    Overall Patient Progress: no changeOverall Patient Progress: no change    Outcome Evaluation: PT is a/ox4, pt refusing fall precuation bundle so is Indep in room. HR per tele tech is 110-130 hwoever on our machine it reads in the 50-80's. PT has AFIB per tele. Pt made NPO for cardioversion w/Cards. cARDIOLOGY FOLLOWING. PT R.frank is SL.      Problem: Adult Inpatient Plan of Care  Goal: Plan of Care Review  Description: The Plan of Care Review/Shift note should be completed every shift.  The Outcome Evaluation is a brief statement about your assessment that the patient is improving, declining, or no change.  This information will be displayed automatically on your shift  note.  Outcome: Progressing  Flowsheets (Taken 12/20/2024 0955)  Outcome Evaluation: PT is a/ox4, pt refusing fall precuation bundle so is Indep in room. HR per tele tech is 110-130 hwoever on our machine it reads in the 50-80's. PT has AFIB per tele. Pt made NPO for cardioversion w/Cards. cARDIOLOGY FOLLOWING. PT R.ac is SL.  Plan of Care Reviewed With: patient  Overall Patient Progress: no change  Goal: Patient-Specific Goal (Individualized)  Description: You can add care plan individualizations to a care plan. Examples  "of Individualization might be:  \"Parent requests to be called daily at 9am for status\", \"I have a hard time hearing out of my right ear\", or \"Do not touch me to wake me up as it startles  me\".  Outcome: Progressing  Goal: Absence of Hospital-Acquired Illness or Injury  Outcome: Progressing  Goal: Optimal Comfort and Wellbeing  Outcome: Progressing  Goal: Readiness for Transition of Care  Outcome: Progressing     Problem: Chest Pain  Goal: Resolution of Chest Pain Symptoms  Outcome: Progressing     "

## 2024-12-20 NOTE — DISCHARGE SUMMARY
"[facility]  Hospitalist Discharge Summary      Date of Admission:  12/19/2024  Date of Discharge:  12/20/2024  Discharging Provider: Chris Mike MD  Discharge Service: Hospitalist Service    Discharge Diagnoses   Rapid atrial fibrillation    Clinically Significant Risk Factors     # DMII: A1C = 6.5 % (Ref range: <5.7 %) within past 6 months  # Obesity: Estimated body mass index is 31.04 kg/m  as calculated from the following:    Height as of this encounter: 1.676 m (5' 6\").    Weight as of this encounter: 87.2 kg (192 lb 4.8 oz).       Follow-ups Needed After Discharge   Follow-up Appointments       Follow-up and recommended labs and tests       Follow up with primary care provider, [facility], within 7 days for hospital follow- up.  The following labs/tests are recommended: basic labs.  You will be wearing a 30 day cardiac event monitor  Follow-up with Cardiology as directed ([phone])                Unresulted Labs Ordered in the Past 30 Days of this Admission       No orders found for last 31 day(s).        These results will be followed up by NA    Discharge Disposition   Discharged to home  Condition at discharge: Stable    Hospital Course    73 yo male with history of carotid artery stenosis, hypertension, prediabetes, and prostate cancer who presents to the ED for evaluation of shortness of breath. The patient reports he has been on prednisone for about a month for giant cell arteritis and has been tapering off of this. Today he noticed he had increased work of breathing and congestion.  He has also noted increased LE swelling.  He presented to Urgent Care where he was diagnosed with new onset a-fib.  The patient was instructed to go to the [facility] ED for further evaluation     On presentation to the ER, vital signs notable for systolic blood pressure 145, heart rate near 115 bpm, no fever, no hypoxia     Labs notable for white blood cell count near 14.  TSH " normal.  BNP normal at 730.  Troponin normal.  Magnesium normal.  D-dimer was elevated at 1.2.     EKG was obtained which I personally reviewed showing irregular rhythm consistent with atrial fibrillation     Chest CT scan was obtained showing no evidence of pulm embolism.  However, there is bibasilar groundglass opacities suggesting pulmonary edema.     I spoke to the ER provider.  Plan is for admission to the hospital for rapid atrial fibrillation and pulmonary edema     I assumed care of the patient today.  He has been seen by cardiology.  He was taken for cardioversion.  I have spoken again with Dr. Lucero.  His medications have been changed.  He will follow-up with cardiology as an outpatient.  Wife is at his bedside.  Questions were answered.  Patient is feeling well.      Consultations This Hospital Stay   CARDIOLOGY IP CONSULT  PHARMACY LIAISON FOR MEDICATION COVERAGE CONSULT  PHARMACY LIAISON FOR MEDICATION COVERAGE CONSULT    Code Status   Full Code    Time Spent on this Encounter   I, Chris Mike MD, personally saw the patient today and spent greater than 30 minutes discharging this patient.       Chris Mike MD  [facility] OBSERVATION DEPT  [address] [place] [address]  Phone: [phone]  ______________________________________________________________________    Physical Exam   Vital Signs: Temp: 97.8  F (36.6  C) Temp src: Oral BP: (!) 125/95 Pulse: 94   Resp: 19 SpO2: 98 % O2 Device: Nasal cannula Oxygen Delivery: 2 LPM  Weight: 192 lbs 4.8 oz  Constitutional: awake, alert, cooperative, no apparent distress, and appears stated age  Eyes: Lids and lashes normal, pupils equal, round and reactive to light, extra ocular muscles intact, sclera clear, conjunctiva normal  ENT: Normocephalic, without obvious abnormality, atraumatic, sinuses nontender on palpation, external ears without lesions, oral pharynx with moist mucous membranes, tonsils without erythema or exudates,  gums normal and good dentition.       Primary Care Physician   [facility]    Discharge Orders      Basic metabolic panel     CBC with platelets     Follow-Up with Cardiology LUCIUS      Cardiac Rehab  Referral      Follow-Up with Cardiology LUCIUS      Reason for your hospital stay    Rapid atrial fibrillation  S/p cardioversion     Follow-up and recommended labs and tests     Follow up with primary care provider, [facility], within 7 days for hospital follow- up.  The following labs/tests are recommended: basic labs.  You will be wearing a 30 day cardiac event monitor  Follow-up with Cardiology as directed ([phone])     Activity    Your activity upon discharge: activity as tolerated     Discharge Instructions    Continue your home aspirin     Diet    Follow this diet upon discharge: Regular, advance as tolerated       Significant Results and Procedures   Most Recent 3 CBC's:  Recent Labs   Lab Test 12/20/24  0540 12/19/24  1439 03/23/21  2155   WBC 7.6 13.8* 8.8   HGB 12.4* 13.2* 15.2   MCV 89 88 89    214 157     Most Recent 3 BMP's:  Recent Labs   Lab Test 12/20/24  0802 12/20/24  0540 12/19/24  1439 03/23/21  2155   NA  --  139 134* 128*   POTASSIUM 4.4 4.5 4.7 4.2   CHLORIDE  --  101 99 97   CO2  --  27 21* 25   BUN  --  25.4* 24.4* 15   CR  --  1.27* 0.95 1.43*   ANIONGAP  --  11 14 6   JOSE LUIS  --  9.3 9.3 9.5   GLC  --  105* 152* 134*     Most Recent 2 LFT's:No lab results found.,   Results for orders placed or performed during the hospital encounter of 12/19/24   CT Chest Pulmonary Embolism w Contrast    Narrative    CT CHEST PULMONARY EMBOLISM WITH CONTRAST 12/19/2024 3:52 PM    CLINICAL HISTORY: New onset Afib, chest pain, elevated dimer.    TECHNIQUE: CT angiogram chest during arterial phase injection IV  contrast. 2D and 3D MIP reconstructions were performed by the CT  technologist. Dose reduction techniques were used.   CONTRAST: 80 mL  [id number]    COMPARISON: None.    FINDINGS:  ANGIOGRAM CHEST: Pulmonary arteries are normal caliber and negative  for pulmonary emboli. Thoracic aorta is negative for dissection. No CT  evidence of right heart strain.    LUNGS AND PLEURA: Trace pleural fluid. No pneumothorax. Patchy  groundglass opacities at the lower lungs, along with mild  peribronchial wall thickening. No dense lobar consolidation.    MEDIASTINUM/AXILLAE: Mild cardiomegaly. A few enlarged mediastinal  lymph nodes. Left paratracheal example is 10 mm series 6 image 97.    CORONARY ARTERY CALCIFICATION: Previous intervention (stents or CABG).    UPPER ABDOMEN: Retrograde enhancement of the hepatic veins. No acute  upper abdominal abnormality otherwise seen.    MUSCULOSKELETAL: Degenerative changes of the spine. Sternotomy.      Impression    IMPRESSION:  1.  No evidence for pulmonary embolism.  2.  Bibasilar groundglass opacities suggesting pulmonary edema. Mild  cardiomegaly. Small bibasilar pleural fluid.  3.  A few mildly prominent thoracic lymph nodes.  4.  Mild bilateral peribronchial wall thickening may be as a result of  pulmonary edema versus a bronchitis.     AIDA CUADRA MD         SYSTEM ID:  [id number]   US Lower Extremity Venous Duplex Bilateral    Narrative    EXAM: US LOWER EXTREMITY VENOUS DUPLEX BILATERAL  LOCATION: [facility]  DATE: 12/19/2024    INDICATION: bilateral edema, new onset a fib  COMPARISON: None.  TECHNIQUE: Venous Duplex ultrasound of bilateral lower extremities with and without compression, augmentation and duplex. Color flow and spectral Doppler with waveform analysis performed.    FINDINGS: Exam includes the common femoral, femoral, popliteal veins as well as segmentally visualized deep calf veins and greater saphenous vein.     RIGHT: No deep vein thrombosis. No superficial thrombophlebitis. Fluid collection within the right popliteal fossa measuring approximately 2.4 x 2.1 x 0.5 cm.    LEFT:  No deep vein thrombosis. No superficial thrombophlebitis. No popliteal cyst.      Impression    IMPRESSION:  1.  No deep venous thrombosis in the bilateral lower extremities.  2.  Fluid collection within the right popliteal fossa measuring up to 2.4 cm.   Transesophageal Echocardiogram     Value    LVEF  35-40%    [facility]    [id number]  [id number]  [id number]  [id number]^TREVA^LUIS ANGEL^RUPAL     [facility]  Echocardiography Laboratory  [address] [place] [address]     Name: LINDA DOMINGUEZ  MRN: [id number]  : 1950  Study Date: 2024 10:44 AM  Age: 74 yrs  Gender: Male  Patient Location: [facility]  Reason For Study: Afib  Ordering Physician: LUIS ANGEL TILLEY  Performed By: Luda Pichardo     BSA: 2.0 m2  Height: 66 in  Weight: 192 lb  BP: 130/72 mmHg  ______________________________________________________________________________  Procedure  Transesophageal Echocardiogram with two-dimensional, color and spectral  Doppler. KATE Probe serial #[id number] (R) was used during the procedure. The heart  rate, respiratory rate and response to care were monitored throughout the  procedure with the assistance of the nurse.  ______________________________________________________________________________  Interpretation Summary     The visual ejection fraction is 35-40%.  There is mild-moderate global hypokinesia of the left ventricle.  Moderately decreased right ventricular systolic function  There is moderate biatrial enlargement. Moderate spontaneous echo contrast  seen in left atrium  No thrombus is detected in the left atrial appendage. Preserved mechanical  function.  The rhythm was rapid atrial fibrillation.  Moderate atherosclerotic plaque(s) in the descending aorta and aortic arch.  ______________________________________________________________________________  KATE  I determined this patient to be an appropriate candidate for the planned  sedation and procedure and have reassessed the patient  immediately prior to  sedation and procedure. Total sedation time: 9 minutes minutes of continuous  bedside 1:1 monitoring. Versed (3mg) was given intravenously. Fentanyl (50mcg)  was given intravenously. The transesophageal probe was passed without  difficulty. There were no complications associated with this procedure.     Left Ventricle  The left ventricle is normal in size. There is normal left ventricular wall  thickness. The visual ejection fraction is 35-40%. Left ventricular diastolic  function is abnormal. There is mild-moderate global hypokinesia of the left  ventricle.     Right Ventricle  The right ventricle is borderline dilated. Moderately decreased right  ventricular systolic function.     Atria  There is moderate biatrial enlargement. No thrombus is detected in the left  atrial appendage.     Mitral Valve  The mitral valve leaflets appear normal. There is no evidence of stenosis,  fluttering, or prolapse. There is trace to mild mitral regurgitation.     Tricuspid Valve  Normal tricuspid valve. There is trace tricuspid regurgitation.     Aortic Valve  There is mild trileaflet aortic sclerosis.     Pulmonic Valve  The pulmonic valve is not well seen, but is grossly normal.     Vessels  The aortic root is normal size. Moderate atherosclerotic plaque(s) in the  descending aorta.     Pericardial/Pleural  There is no pericardial effusion.     Rhythm  The rhythm was rapid atrial fibrillation.  ______________________________________________________________________________  Report approved by: Girma Macias MD on 12/20/2024 12:13 PM     ______________________________________________________________________________      *KATE with Cardioversion    Narrative    Girma Macias MD     12/20/2024 11:46 AM  [facility]    Procedure: *KATE with Cardioversion    Date/Time: 12/20/2024 11:45 AM    Performed by: Girma Macias MD  Authorized by: Girma Macias MD      UNIVERSAL PROTOCOL    Site Marked: NA  Prior Images Obtained and Reviewed:  Yes  Required items: Required blood products, implants, devices and special   equipment available    Patient identity confirmed:  Verbally with patient  Patient was reevaluated immediately before administering moderate or deep   sedation or anesthesia  Confirmation Checklist:  Patient's identity using two indicators  Time out: Immediately prior to the procedure a time out was called    Universal Protocol: the Joint Commission Universal Protocol was followed    Preparation: Patient was prepped and draped in usual sterile fashion    Anesthesia was administered and monitored by anesthesiology.  See   anesthesia documentation for details.     ANESTHESIA    Anesthesia was administered and monitored by anesthesiology.  See   anesthesia documentation for details.    SEDATION  Patient Sedated: Yes    Sedation Type:  Moderate (conscious) sedation  Vital signs: Vital signs monitored during sedation      PROCEDURE  Describe Procedure: DCCV  Patient Tolerance:  Patient tolerated the procedure well with no immediate   complications       Discharge Medications   Current Discharge Medication List        START taking these medications    Details   acetaminophen (TYLENOL) 325 MG tablet Take 2 tablets (650 mg) by mouth every 4 hours as needed for mild pain or other (and adjunct with moderate or severe pain or per patient request).    Associated Diagnoses: Pain      !! amiodarone (PACERONE) 200 MG tablet Take 1 tablet (200 mg) by mouth 3 times daily.  Qty: 41 tablet, Refills: 0    Associated Diagnoses: Atrial fibrillation with RVR (H)      !! amiodarone (PACERONE) 200 MG tablet Take 1 tablet (200 mg) by mouth daily.  Qty: 30 tablet, Refills: 0    Associated Diagnoses: Atrial fibrillation with RVR (H)      apixaban ANTICOAGULANT (ELIQUIS) 5 MG tablet Take 1 tablet (5 mg) by mouth 2 times daily.  Qty: 60 tablet, Refills: 1    Associated Diagnoses: Atrial fibrillation with RVR (H)       furosemide (LASIX) 20 MG tablet Take 1 tablet (20 mg) by mouth daily as needed (take 1 tablet daily for abnormal weight gain of 3-5lb/day or sign/symptoms of fluid overload, call cardiology if needing this this more than 3x/week).  Qty: 30 tablet, Refills: 1    Associated Diagnoses: Acute pulmonary edema (H); Atrial fibrillation with RVR (H)      metoprolol succinate ER (TOPROL XL) 25 MG 24 hr tablet Take 1 tablet (25 mg) by mouth daily.  Qty: 30 tablet, Refills: 1    Associated Diagnoses: Atrial fibrillation with RVR (H)       !! - Potential duplicate medications found. Please discuss with provider.        CONTINUE these medications which have CHANGED    Details   losartan (COZAAR) 25 MG tablet Take 0.5 tablets (12.5 mg) by mouth daily. Hold your losartan for now. If your blood pressures at home are consistently >150 (systolic), then resume 12.5mg (1/2 tab) daily           CONTINUE these medications which have NOT CHANGED    Details   albuterol (PROAIR HFA/PROVENTIL HFA/VENTOLIN HFA) 108 (90 Base) MCG/ACT inhaler Inhale 1-2 puffs into the lungs every 6 hours as needed for shortness of breath.      amoxicillin (AMOXIL) 500 MG capsule Take 4 capsules by mouth. 1 hour prior to dental appointments      clobetasol (TEMOVATE) 0.05 % external ointment Apply topically twice a day to affected areas on trunk and extremities for up to 2 weeks.  Take a 3-5 day break and restart 2 weeks as needed      fluticasone-salmeterol (ADVAIR) 250-50 MCG/ACT inhaler Inhale 1 puff into the lungs 2 times daily.      OTEZLA 30 MG tablet Take 1 tablet by mouth 2 times daily.      predniSONE (DELTASONE) 5 MG tablet Take 10 Tablets (50 mg) by mouth daily for 7 days, THEN 8 Tablets (40 mg) daily for 7 days, THEN 7 Tablets (35 mg) daily for 7 days, THEN 6 Tablets (30 mg) daily for 7 days, THEN 5 Tablets (25 mg) daily for 7 days, THEN 4 Tablets (20 mg) daily for 28 days.      rosuvastatin (CRESTOR) 40 MG tablet Take 40 mg by mouth daily.       sildenafil (VIAGRA) 100 MG tablet Take 100 mg by mouth daily as needed.      sulfamethoxazole-trimethoprim (BACTRIM DS) 800-160 MG tablet Take 1 tablet by mouth Every Mon, Wed, Fri Morning.      tadalafil (CIALIS) 5 MG tablet Take 1 tablet by mouth daily.      triamcinolone (KENALOG) 0.1 % external cream Apply topically 2 times daily.      valACYclovir (VALTREX) 500 MG tablet Take 1 tablet by mouth daily.           Allergies   Allergies   Allergen Reactions    Atorvastatin Muscle Pain (Myalgia)

## 2024-12-20 NOTE — PRE-PROCEDURE
GENERAL PRE-PROCEDURE:   Procedure:  Sreedhar/dccv  Date/Time:  12/20/2024 10:53 AM    Verbal consent obtained?: Yes    Written consent obtained?: No    Risks and benefits: Risks, benefits and alternatives were discussed    Consent given by:  Patient  Patient states understanding of procedure being performed: Yes    Patient's understanding of procedure matches consent: Yes    Procedure consent matches procedure scheduled: Yes    Expected level of sedation:  Moderate  Appropriately NPO:  Yes  ASA Class:  2  Mallampati  :  Grade 2- soft palate, base of uvula, tonsillar pillars, and portion of posterior pharyngeal wall visible  Lungs:  Lungs clear with good breath sounds bilaterally  Heart:  Normal heart sounds and rate and a-fib  History & Physical reviewed:  History and physical reviewed and no updates needed  Statement of review:  I have reviewed the lab findings, diagnostic data, medications, and the plan for sedation

## 2024-12-20 NOTE — CONSULTS
[facility]    Cardiology Consultation     Marcio Renner MRN#: [id number]   YOB: 1950 Age: 74 year old     Date of Admission:  12/19/2024    Consult Indication:  atrial fibrillation with RVR    Assessment & Plan     # Dyspnea on exertion, suspect related to atrial fibrillation with RVR.  New diagnosis.  [id number] 4.  Could have some component of cardiomyopathy as well.  Overall clinical presentation not characteristic of an acute coronary syndrome.  # CAD s/p CABG, no angina, negative troponin  # JOSE, asymptomatic  # GCA on prednisone    - Reviewed prior cardiac history, hospital course and cardiac diagnostic studies with patient and his wife (on speaker phone)  - Discussed options for further management of the atrial fibrillation with RVR, rates reasonably well-controlled, though has had some issues with hypotension overnight, given overall clinical presentation, would favor rhythm control strategy with KATE/DCCV.  Discussed alternative strategy of rate control and outpatient follow-up.  Discussed advantages/limitations, risks/benefits of each strategy at length.  After an in-depth discussion, patient and wife would like to proceed with a KATE/DCCV.  No contraindications.  Discussed requirement of at least 4 weeks of uninterrupted anticoagulation following DCCV.  Patient is planning to follow-up with Vascular surgery for the carotid artery stenosis, however was told that this was not urgent.  - Agree with apixaban, weighing risks/benefits, would favor continuing aspirin given carotid artery stenosis and prior history of CABG, cautioned on increased bleeding risk   -continue rosuvastatin  - Agree with holding losartan for now due to lower blood pressures  - Further recommendations pending KATE/DCCV  - Cardiology will follow    Please do not hesitate to page with any questions or concerns.     Dex Lucero MD, FACC  [facility]  Cardiology  December 20, 2024    Voice  recognition software utilized.   High complexity     History of Present Illness     Patient is a 74-year-old male with a past medical history significant for CAD s/p CABG (2005 LIMA to LAD and LT RA to OM branch of circumflex), carotid artery stenosis, hypertension, recently diagnosed giant cell arteritis, who presents with dyspnea on exertion.    Patient is followed closely by Rheumatology for recently diagnosed giant cell arteritis, currently being treated with prednisone taper and PJP prophylaxis.  Patient is also followed by Vascular surgery for carotid artery stenosis, CT angio head/neck demonstrated 70% stenosis of the left ICA, 50% stenosis of the right ICA.  Prescribed aspirin, clopidogrel, rosuvastatin, though reports not taking the clopidogrel.     Patient has been experiencing 1 to 2 weeks of worsening dyspnea on exertion.  In the ED blood pressure was 144/113 mmHg.  CT PE study negative for PE, however did demonstrate bibasilar groundglass opacities suggesting pulmonary edema, small bibasilar pleural fluid, mild cardiomegaly.  ECG demonstrated atrial fibrillation with RVR at 116 bpm, T wave inversions in leads II, III, aVF.  Labs notable for high-sensitivity troponin normal x 2.  NT proBNP 700.  TSH normal.  Patient was administered diltiazem IV with improvement in heart rates.  Started on metoprolol tartrate 12.5 mg every 6 hours for rate management, started on apixaban.  He was administered furosemide.  This morning creatinine increased to 1.27.  He does note improvement in his dyspnea.  Heart rates remain in the 90 to 130 bpm range in atrial fibrillation.      Past Medical History   CAD s/p CABG (2005 LIMA to LAD and LT RA to OM branch of circumflex), carotid artery stenosis, hypertension, recently diagnosed giant cell arteritis    Past Surgical History   Past Surgical History:   Procedure Laterality Date    IR LUMBAR PUNCTURE  4/12/2023    IR LUMBAR PUNCTURE  7/8/2019       Prior to Admission  Medications   Prior to Admission Medications   Prescriptions Last Dose Informant Patient Reported? Taking?   docusate sodium (COLACE) 100 MG capsule   No No   Sig: Take 1 capsule (100 mg) by mouth 3 times daily as needed for constipation   oxyCODONE (ROXICODONE) 5 MG tablet   No No   Sig: Take 1 tablet (5 mg) by mouth every 6 hours as needed for pain      Facility-Administered Medications: None     Current Facility-Administered Medications   Medication Dose Route Frequency Provider Last Rate Last Admin    acetaminophen (TYLENOL) tablet 650 mg  650 mg Oral Q4H PRN Keith Mobley MD        Or    acetaminophen (TYLENOL) Suppository 650 mg  650 mg Rectal Q4H PRN Keith Mobley MD        apixaban ANTICOAGULANT (ELIQUIS) tablet 5 mg  5 mg Oral BID Keith Mobley MD   5 mg at 12/19/24 2017    furosemide (LASIX) injection 20 mg  20 mg Intravenous Q12H Keith Mobley MD   20 mg at 12/19/24 2017    melatonin tablet 1 mg  1 mg Oral At Bedtime PRN Keith Mobley MD        metoprolol tartrate (LOPRESSOR) half-tab 12.5 mg  12.5 mg Oral Q6H Keith Mobley MD   12.5 mg at 12/20/24 0432    ondansetron (ZOFRAN ODT) ODT tab 4 mg  4 mg Oral Q6H PRN Keith Mobley MD        Or    ondansetron (ZOFRAN) injection 4 mg  4 mg Intravenous Q6H PRN Keith Mobley MD        Patient is already receiving anticoagulation with heparin, enoxaparin (LOVENOX), warfarin (COUMADIN)  or other anticoagulant medication   Does not apply Continuous PRN Keith Mobley MD        prochlorperazine (COMPAZINE) injection 5 mg  5 mg Intravenous Q6H PRN Keith Mobley MD        Or    prochlorperazine (COMPAZINE) tablet 5 mg  5 mg Oral Q6H PRN Keith Mobley MD        senna-docusate (SENOKOT-S/PERICOLACE) 8.6-50 MG per tablet 1 tablet  1 tablet Oral BID PRN Keith Mobley MD        Or    senna-docusate (SENOKOT-S/PERICOLACE) 8.6-50 MG per tablet 2 tablet  2 tablet Oral BID PRN Leandra,  Keith Alvarenga MD         Current Facility-Administered Medications   Medication Dose Route Frequency Provider Last Rate Last Admin    acetaminophen (TYLENOL) tablet 650 mg  650 mg Oral Q4H PRN Keith Mobley MD        Or    acetaminophen (TYLENOL) Suppository 650 mg  650 mg Rectal Q4H PRN Keith Mobley MD        apixaban ANTICOAGULANT (ELIQUIS) tablet 5 mg  5 mg Oral BID Keith Moblye MD   5 mg at 12/19/24 2017    furosemide (LASIX) injection 20 mg  20 mg Intravenous Q12H Keith Mobley MD   20 mg at 12/19/24 2017    melatonin tablet 1 mg  1 mg Oral At Bedtime PRN Keith Mobley MD        metoprolol tartrate (LOPRESSOR) half-tab 12.5 mg  12.5 mg Oral Q6H Keith Mobley MD   12.5 mg at 12/20/24 0432    ondansetron (ZOFRAN ODT) ODT tab 4 mg  4 mg Oral Q6H PRN Keith Mobley MD        Or    ondansetron (ZOFRAN) injection 4 mg  4 mg Intravenous Q6H PRN Keith Mobley MD        Patient is already receiving anticoagulation with heparin, enoxaparin (LOVENOX), warfarin (COUMADIN)  or other anticoagulant medication   Does not apply Continuous PRN Keith Mobley MD        prochlorperazine (COMPAZINE) injection 5 mg  5 mg Intravenous Q6H PRN Keith Mobley MD        Or    prochlorperazine (COMPAZINE) tablet 5 mg  5 mg Oral Q6H PRN Keith Mobley MD        senna-docusate (SENOKOT-S/PERICOLACE) 8.6-50 MG per tablet 1 tablet  1 tablet Oral BID PRN Keith Mobley MD        Or    senna-docusate (SENOKOT-S/PERICOLACE) 8.6-50 MG per tablet 2 tablet  2 tablet Oral BID PRN Keith Mobley MD         Allergies   Allergies   Allergen Reactions    Atorvastatin Muscle Pain (Myalgia)         Review of Systems   A comprehensive review of system was performed and is negative other than that noted in the HPI or here.     Physical Exam   Vital Signs with Ranges  Temp:  [97.6  F (36.4  C)] 97.6  F (36.4  C)  Pulse:  [] 89  Resp:  [15-29] 16  BP:  "()/() 91/65  SpO2:  [89 %-98 %] 98 %  Wt Readings from Last 4 Encounters:   24 87.2 kg (192 lb 4.8 oz)   21 83.9 kg (185 lb)     No intake/output data recorded.    Vital signs were personally reviewed:  Temperatures:  Current - Temp: 97.6  F (36.4  C); Max - Temp  Av.6  F (36.4  C)  Min: 97.6  F (36.4  C)  Max: 97.6  F (36.4  C)  Respiration range: Resp  Av.7  Min: 15  Max: 29  Pulse range: Pulse  Av  Min: 77  Max: 134  Blood pressure range: Systolic (24hrs), Av , Min:90 , Max:144   ; Diastolic (24hrs), Av, Min:37, Max:113    Pulse oximetry range: SpO2  Av %  Min: 89 %  Max: 98 %  No intake or output data in the 24 hours ending 24 0738  192 lbs 4.8 oz  Body mass index is 31.04 kg/m .   Body surface area is 2.02 meters squared.    Physical Exam:   General/Constitutional: appears stated age, in no apparent distress, appears to be well nourished  Respiratory: clear to auscultation bilaterally  Cardiovascular: distant heart sounds but tachycardic, irreg irreg, no murmurs, no lower extremity edema    Laboratory tests personally reviewed:   CMP  Recent Labs   Lab 24  0540 24  1439    134*   POTASSIUM 4.5 4.7   CHLORIDE 101 99   CO2 27 21*   ANIONGAP 11 14   * 152*   BUN 25.4* 24.4*   CR 1.27* 0.95   GFRESTIMATED 59* 84   JOSE LUIS 9.3 9.3   MAG  --  2.2     CBC  Recent Labs   Lab 24  0540 24  1439   WBC 7.6 13.8*   RBC 4.46 4.74   HGB 12.4* 13.2*   HCT 39.5* 41.9   MCV 89 88   MCH 27.8 27.8   MCHC 31.4* 31.5   RDW 16.2* 15.9*    214     INRNo lab results found in last 7 days.  No results found for: \"TROPI\", \"TROPONIN\", \"TROPR\", \"TROPN\"  No results for input(s): \"CHOL\", \"HDL\", \"LDL\", \"TRIG\", \"CHOLHDLRATIO\" in the last 90613 hours.  Lab Results   Component Value Date    A1C 6.5 2024     TSH   Date Value Ref Range Status   2024 1.20 0.30 - 4.20 uIU/mL Final       Clinically Significant Risk Factors Present on " "Admission         # Hyponatremia: Lowest Na = 134 mmol/L in last 2 days, will monitor as appropriate                    # DMII: A1C = 6.5 % (Ref range: <5.7 %) within past 6 months    # Obesity: Estimated body mass index is 31.04 kg/m  as calculated from the following:    Height as of this encounter: 1.676 m (5' 6\").    Weight as of this encounter: 87.2 kg (192 lb 4.8 oz).             Cardiac Arrhythmia: Atrial fibrillation: Persistent        "

## 2024-12-20 NOTE — PROGRESS NOTES
Left message for RN who did cardioversion to touch base for report as Marti is support staff and report hand off was never given to me

## 2024-12-20 NOTE — PLAN OF CARE
"PRIMARY DIAGNOSIS: CHEST PAIN  OUTPATIENT/OBSERVATION GOALS TO BE MET BEFORE DISCHARGE:  1. Ruled out acute coronary syndrome (negative or stable Troponin):  No  2. Pain Status: Pain free.  3. Appropriate provocative testing performed: Yes  - Stress Test Procedure: TTE  - Interpretation of cardiac rhythm per telemetry tech: Afib 108s    4. Cleared by Consultants (if applicable):No  5. Return to near baseline physical activity: Yes  Discharge Planner Nurse   Safe discharge environment identified: Yes  Barriers to discharge: Yes       Entered by: Sharon Sims RN 12/20/2024 5:09 AM     Please review provider order for any additional goals.   Nurse to notify provider when observation goals have been met and patient is ready for discharge.    Goal Outcome Evaluation:      Plan of Care Reviewed With: patient    Overall Patient Progress: no changeOverall Patient Progress: no change    Outcome Evaluation: A&OX4, calm and cooperative SBA, Denies chest pain/SOB, RA, VSS except -108, on tele,Cardiology is consulted,  possible TTE today.    Problem: Adult Inpatient Plan of Care  Goal: Plan of Care Review  Description:   note.  Outcome: Progressing  Flowsheets (Taken 12/20/2024 0500)  Outcome Evaluation: A&OX4, calm and cooperative SBA, VSS except -108, on tele, denies chest pain, Cardiology is consulted,  possible TTE today.  Plan of Care Reviewed With: patient  Overall Patient Progress: no change  Goal: Patient-Specific Goal (Individualized)  Description: You can add care plan individualizations to a care plan. Examples of Individualization might be:  \"Parent requests to be called daily at 9am for status\", \"I have a hard time hearing out of my right ear\", or \"Do not touch me to wake me up as it startles  me\".  Outcome: Progressing  Goal: Absence of Hospital-Acquired Illness or Injury  Outcome: Progressing  Intervention: Identify and Manage Fall Risk  Recent Flowsheet Documentation  Taken 12/20/2024 0125 by Levi, " Sharon, RN  Safety Promotion/Fall Prevention: activity supervised  Intervention: Prevent Skin Injury  Recent Flowsheet Documentation  Taken 12/20/2024 0125 by Sharon Sims, RN  Body Position: position changed independently  Goal: Optimal Comfort and Wellbeing  Outcome: Progressing  Goal: Readiness for Transition of Care  Outcome: Progressing

## 2024-12-22 ENCOUNTER — PATIENT OUTREACH (OUTPATIENT)
Dept: CARE COORDINATION | Facility: CLINIC | Age: 74
End: 2024-12-22
Payer: COMMERCIAL

## 2024-12-22 NOTE — PROGRESS NOTES
Connected Care Resource Center:   [facility] Resource Center Contact  [facility]/Voicemail     Clinical Data: Post-Discharge Outreach     Outreach attempted x 2.  Left message on patient's voicemail, providing [facility]'s central phone number of [phone] ([phone]) for questions/concerns and/or to schedule an appt with an [facility] provider, if they do not have a PCP.      Plan:  [facility] will do no further outreaches at this time.       Miley Cheema MA  Connected Care Resource Center, [facility]    *Connected Care Resource Team does NOT follow patient ongoing. Referrals are identified based on internal discharge reports and the outreach is to ensure patient has an understanding of their discharge instructions.

## 2024-12-23 ENCOUNTER — TELEPHONE (OUTPATIENT)
Dept: CARDIOLOGY | Facility: CLINIC | Age: 74
End: 2024-12-23
Payer: COMMERCIAL

## 2024-12-23 DIAGNOSIS — I48.91 ATRIAL FIBRILLATION WITH RVR (H): ICD-10-CM

## 2024-12-23 DIAGNOSIS — I50.22 CHRONIC SYSTOLIC HEART FAILURE (H): Primary | ICD-10-CM

## 2024-12-23 RX ORDER — METOPROLOL SUCCINATE 25 MG/1
50 TABLET, EXTENDED RELEASE ORAL DAILY
COMMUNITY
Start: 2024-12-23

## 2024-12-23 NOTE — TELEPHONE ENCOUNTER
Received fax update from Dhruv Sebastian with urgent strip from 12/21/2024 (11 PM) showing new atrial flutter @ 126 BPM.    Patient was discharged 12/20/2024 post DCCV in sinus rhythm.    1110 called patient for update. He states he didn't realize he had an episode on Saturday night. He is feeling ok today, only SOB with steps. He thinks his heart rate is OK. Patient states he is compliant with the amiodarone 200mg TID.    Patient notes he has been taking the lasix 20mg daily (not prn) since discharge as he has edema up to his calves. He is wearing the compression stockings and is elevating his legs today.    Patient will call the [facility] today to set up his LUCIUS visit and labs.    Will message Dr. Lucero to review          1200 patient called back with an update from his O2 Sat showing 97% but his HR is jumping from  BPM

## 2024-12-23 NOTE — TELEPHONE ENCOUNTER
Reply from Dr. Nic Lucero, MD Ted Smith, Medina MOREAU RN; JAYLAN Whyte [facility] Heart Team 2  Caller: Unspecified (Today, 10:59 AM)  Recommend he check his BP and if BP is not too low (systolic >100 mmHg) then he should double the metoprolol succinate to 50 mg daily and continue the amiodarone and monitor, if symptomatic should go to ED    1300 called patient to review Dr. Lucero's recommendations. He will start the toprol Xl 50mg today. He will borrow or purchase a BP cuff and call if systolic BP is not >100.  Patient will seek ED assessment if symptoms progress.

## 2024-12-30 ENCOUNTER — TELEPHONE (OUTPATIENT)
Dept: CARDIOLOGY | Facility: CLINIC | Age: 74
End: 2024-12-30
Payer: COMMERCIAL

## 2024-12-30 NOTE — TELEPHONE ENCOUNTER
"Patient left voicemails x2 over the weekend inquiring if it is OK for him to wear air leg compressors while he has his event monitor on.     Spoke to patient, says the equipment is \"heavy duty\" compression. He wants to try and use it alongside medication to help his edema. Recommended waiting to use the equipment until we can review with Dr Lucero. He expressed understanding.       Addendum 1105: Patient called back and left another message asking about cardiac rehab, says he cannot schedule prior to 1/31/25 due to insurance. Returned patient's call, recommended contact cardiac rehab dept directly regarding any questions related to his sessions. Ph number provided.     Addendum 1228: Patient left another message saying that he needs a prior authorization to start cardiac rehab sooner than its currently scheduled date of 2/6. Per session appointment note, \"cannot schedule before 1/31/25.\" Called Beth KWON to discuss further, left a message to call back.   "

## 2025-01-06 ENCOUNTER — TELEPHONE (OUTPATIENT)
Dept: CARDIOLOGY | Facility: CLINIC | Age: 75
End: 2025-01-06
Payer: COMMERCIAL

## 2025-01-06 DIAGNOSIS — I48.91 ATRIAL FIBRILLATION WITH RVR (H): ICD-10-CM

## 2025-01-06 DIAGNOSIS — R60.0 PERIPHERAL EDEMA: Primary | ICD-10-CM

## 2025-01-06 NOTE — TELEPHONE ENCOUNTER
Regarding lab results and BP check (see result notes):  ----- Message from Dex Lucero sent at 1/5/2025 10:01 AM CST -----  Reviewed labs, please call and clarify his current cardiac regimen, specifically if he is still taking the losartan thanks    ==========================================    Patient called to inform that Dr. Lucero has reviewed lab and BP check results and would like to clarify medication regimen.     UPDATE 1310 - Patient returned call to team 2 nurse line. Reports he is currently taking metoprolol succinate 50 mg daily, amiodarone 200 mg daily and lasix 20 mg daily (ordered as PRN). Patient reports he is no longer taking losartan.     Patient reports BP has been stable, 125/82 today. Reports weight has increased 5 lb since hospital discharge on 12/20/24, however also attributes this to improved appetite and steroid use. Patient reports he is experiencing dyspnea on exertion, however at baseline. Reports swelling in legs and feet at end of day, relieved with elevation.    Patient requests refill on eliquis. [facility] Refill Guideline reviewed.  Medication meets criteria for refill.      Patient has hospital follow up with LUCI[facility] Ra Karimi on 2/5/25.     Dr. Lucero updated regarding patient's medication regimen.

## 2025-01-13 NOTE — TELEPHONE ENCOUNTER
Dex Lucero MD Ashenmacher, Megan, RN; JAYLAN Whyte [facility] Heart Team 2  Cc: JAYLAN Whyte [facility] Heart Team 2  Caller: Unspecified (1 week ago, 10:42 AM)  Thanks please remove the losartan from his med list, he should monitor his weight and if it does continue to increase he should let us know, otherwise continue regimen, CBC, BMP and NTproBNP at follow up visit in a couple of weeks thanks      Med list updated. Called patient and reviewed message from Dr Lucero. He expressed understanding. Orders placed for labs and he will call scheduling to arrange.

## 2025-01-20 ENCOUNTER — TELEPHONE (OUTPATIENT)
Dept: CARDIOLOGY | Facility: CLINIC | Age: 75
End: 2025-01-20
Payer: COMMERCIAL

## 2025-01-20 NOTE — TELEPHONE ENCOUNTER
30-day [facility] summary report received. Consult with Dr. Lucero during admission. Patient to see LUCIUS Ra Karimi on 2/5/2025.   Atrial flutter being treated with eliquis, amiodarone and toprol Xl.    Will message Dr. Lucero to review and sign      1400 signed form sent to scan.

## 2025-01-26 ENCOUNTER — HEALTH MAINTENANCE LETTER (OUTPATIENT)
Age: 75
End: 2025-01-26

## 2025-04-13 ENCOUNTER — HEALTH MAINTENANCE LETTER (OUTPATIENT)
Age: 75
End: 2025-04-13